# Patient Record
Sex: MALE | Race: WHITE | NOT HISPANIC OR LATINO | Employment: FULL TIME | URBAN - METROPOLITAN AREA
[De-identification: names, ages, dates, MRNs, and addresses within clinical notes are randomized per-mention and may not be internally consistent; named-entity substitution may affect disease eponyms.]

---

## 2017-09-11 ENCOUNTER — APPOINTMENT (OUTPATIENT)
Dept: LAB | Facility: MEDICAL CENTER | Age: 52
End: 2017-09-11
Payer: COMMERCIAL

## 2017-09-11 ENCOUNTER — APPOINTMENT (OUTPATIENT)
Dept: LAB | Facility: HOSPITAL | Age: 52
End: 2017-09-11
Payer: COMMERCIAL

## 2017-09-11 ENCOUNTER — ALLSCRIPTS OFFICE VISIT (OUTPATIENT)
Dept: OTHER | Facility: OTHER | Age: 52
End: 2017-09-11

## 2017-09-11 DIAGNOSIS — Z13.220 ENCOUNTER FOR SCREENING FOR LIPOID DISORDERS: ICD-10-CM
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
## 2017-09-11 DIAGNOSIS — R39.12 POOR URINARY STREAM: ICD-10-CM

## 2017-09-11 DIAGNOSIS — R35.1 NOCTURIA: ICD-10-CM

## 2017-09-11 DIAGNOSIS — Z12.5 ENCOUNTER FOR SCREENING FOR MALIGNANT NEOPLASM OF PROSTATE: ICD-10-CM

## 2017-09-11 DIAGNOSIS — R35.0 FREQUENCY OF MICTURITION: ICD-10-CM

## 2017-09-11 LAB
ALBUMIN SERPL BCP-MCNC: 4.2 G/DL (ref 3.5–5)
ALP SERPL-CCNC: 67 U/L (ref 46–116)
ALT SERPL W P-5'-P-CCNC: 24 U/L (ref 12–78)
ANION GAP SERPL CALCULATED.3IONS-SCNC: 6 MMOL/L (ref 4–13)
AST SERPL W P-5'-P-CCNC: 17 U/L (ref 5–45)
BASOPHILS # BLD AUTO: 0.02 THOUSANDS/ΜL (ref 0–0.1)
BASOPHILS NFR BLD AUTO: 0 % (ref 0–1)
BILIRUB SERPL-MCNC: 0.65 MG/DL (ref 0.2–1)
BILIRUB UR QL STRIP: NORMAL
BUN SERPL-MCNC: 15 MG/DL (ref 5–25)
CALCIUM SERPL-MCNC: 9.5 MG/DL (ref 8.3–10.1)
CHLORIDE SERPL-SCNC: 105 MMOL/L (ref 100–108)
CHOLEST SERPL-MCNC: 208 MG/DL (ref 50–200)
CO2 SERPL-SCNC: 29 MMOL/L (ref 21–32)
COLOR UR: YELLOW
CREAT SERPL-MCNC: 1.12 MG/DL (ref 0.6–1.3)
EOSINOPHIL # BLD AUTO: 0.09 THOUSAND/ΜL (ref 0–0.61)
EOSINOPHIL NFR BLD AUTO: 1 % (ref 0–6)
ERYTHROCYTE [DISTWIDTH] IN BLOOD BY AUTOMATED COUNT: 13.5 % (ref 11.6–15.1)
GFR SERPL CREATININE-BSD FRML MDRD: 76 ML/MIN/1.73SQ M
GLUCOSE (HISTORICAL): NORMAL
GLUCOSE P FAST SERPL-MCNC: 78 MG/DL (ref 65–99)
HCT VFR BLD AUTO: 43.9 % (ref 36.5–49.3)
HDLC SERPL-MCNC: 77 MG/DL (ref 40–60)
HGB BLD-MCNC: 15 G/DL (ref 12–17)
HGB UR QL STRIP.AUTO: 250
KETONES UR STRIP-MCNC: NORMAL MG/DL
LDLC SERPL CALC-MCNC: 123 MG/DL (ref 0–100)
LEUKOCYTE ESTERASE UR QL STRIP: NORMAL
LYMPHOCYTES # BLD AUTO: 1.49 THOUSANDS/ΜL (ref 0.6–4.47)
LYMPHOCYTES NFR BLD AUTO: 16 % (ref 14–44)
MCH RBC QN AUTO: 30.5 PG (ref 26.8–34.3)
MCHC RBC AUTO-ENTMCNC: 34.2 G/DL (ref 31.4–37.4)
MCV RBC AUTO: 89 FL (ref 82–98)
MONOCYTES # BLD AUTO: 0.5 THOUSAND/ΜL (ref 0.17–1.22)
MONOCYTES NFR BLD AUTO: 6 % (ref 4–12)
NEUTROPHILS # BLD AUTO: 7.01 THOUSANDS/ΜL (ref 1.85–7.62)
NEUTS SEG NFR BLD AUTO: 77 % (ref 43–75)
NITRITE UR QL STRIP: NORMAL
NRBC BLD AUTO-RTO: 0 /100 WBCS
PH UR STRIP.AUTO: 5 [PH]
PLATELET # BLD AUTO: 358 THOUSANDS/UL (ref 149–390)
PMV BLD AUTO: 10 FL (ref 8.9–12.7)
POTASSIUM SERPL-SCNC: 4.4 MMOL/L (ref 3.5–5.3)
PROT SERPL-MCNC: 7.7 G/DL (ref 6.4–8.2)
PROT UR STRIP-MCNC: NORMAL MG/DL
PSA SERPL-MCNC: 0.7 NG/ML (ref 0–4)
RBC # BLD AUTO: 4.92 MILLION/UL (ref 3.88–5.62)
SODIUM SERPL-SCNC: 140 MMOL/L (ref 136–145)
SP GR UR STRIP.AUTO: 1.02
TRIGL SERPL-MCNC: 40 MG/DL
TSH SERPL DL<=0.05 MIU/L-ACNC: 2 UIU/ML (ref 0.36–3.74)
UROBILINOGEN UR QL STRIP.AUTO: NORMAL
WBC # BLD AUTO: 9.13 THOUSAND/UL (ref 4.31–10.16)

## 2017-09-11 PROCEDURE — 80053 COMPREHEN METABOLIC PANEL: CPT

## 2017-09-11 PROCEDURE — 85025 COMPLETE CBC W/AUTO DIFF WBC: CPT

## 2017-09-11 PROCEDURE — 84443 ASSAY THYROID STIM HORMONE: CPT

## 2017-09-11 PROCEDURE — 86618 LYME DISEASE ANTIBODY: CPT

## 2017-09-11 PROCEDURE — 36415 COLL VENOUS BLD VENIPUNCTURE: CPT

## 2017-09-11 PROCEDURE — G0103 PSA SCREENING: HCPCS

## 2017-09-11 PROCEDURE — 80061 LIPID PANEL: CPT

## 2017-09-11 PROCEDURE — 87086 URINE CULTURE/COLONY COUNT: CPT

## 2017-09-12 LAB
B BURGDOR IGG SER IA-ACNC: 0.2
B BURGDOR IGM SER IA-ACNC: 0.19
BACTERIA UR CULT: NORMAL

## 2017-09-13 ENCOUNTER — GENERIC CONVERSION - ENCOUNTER (OUTPATIENT)
Dept: OTHER | Facility: OTHER | Age: 52
End: 2017-09-13

## 2017-10-06 ENCOUNTER — GENERIC CONVERSION - ENCOUNTER (OUTPATIENT)
Dept: OTHER | Facility: OTHER | Age: 52
End: 2017-10-06

## 2018-01-12 VITALS
DIASTOLIC BLOOD PRESSURE: 64 MMHG | BODY MASS INDEX: 27.34 KG/M2 | TEMPERATURE: 98.5 F | HEIGHT: 66 IN | SYSTOLIC BLOOD PRESSURE: 106 MMHG | HEART RATE: 76 BPM | RESPIRATION RATE: 16 BRPM | OXYGEN SATURATION: 98 % | WEIGHT: 170.13 LBS

## 2018-01-15 NOTE — RESULT NOTES
Verified Results  (1) URINE CULTURE 11Sep2017 07:10PM Kemar Bustos Order Number: [id number]     Test Name Result Flag Reference   CLINICAL REPORT (Report)     Test:        Urine culture  Specimen Type:   Urine  Specimen Date:   9/11/2017 7:10 PM  Result Date:    9/12/2017 7:24 PM  Result Status:   Final result  Resulting Lab:   [place] [address]            Tel: [phone]      CULTURE                                       ------------------                                   [id number] cfu/ml Mixed Contaminants X2     (1) LIPID PANEL, FASTING 11Sep2017 03:45PM Kemar Bustos Order Number: [id number]     Test Name Result Flag Reference   CHOLESTEROL 208 mg/dL H    HDL,DIRECT 77 mg/dL H 40-60   Specimen collection should occur prior to Metamizole administration due to the potential for falsley depressed results  LDL CHOLESTEROL CALCULATED 123 mg/dL H 0-100   Triglyceride:        Normal ??? ??? ??? ??? ??? ??? ??? <150 mg/dl   ??? ??? ???Borderline High ??? ??? 150-199 mg/dl   ??? ??? ? ?? High ??? ??? ??? ??? ??? ??? ??? 200-499 mg/dl   ??? ??? ? ??Very High ??? ??? ??? ??? ??? >499 mg/dl      Cholesterol:       Desirable ??? ??? ??? ??? <200 mg/dl   ??? ??? Borderline High ??? 200-239 mg/dl   ??? ??? High ??? ??? ??? ??? ??? ??? >239 mg/dl      HDL Cholesterol:       High ??? ???>59 mg/dL   ??? ??? Low ??? ??? <41 mg/dL      This screening LDL is a calculated result  It does not have the accuracy of the Direct Measured LDL in the monitoring of patients with hyperlipidemia and/or statin therapy  Direct Measure LDL (QZX340) must be ordered separately in these patients  TRIGLYCERIDES 40 mg/dL  <=150   Specimen collection should occur prior to N-Acetylcysteine or Metamizole administration due to the potential for falsely depressed results       (1) TSH [id number] 03:45PM Kemar Bustos Order Number: [id number]     Test Name Result Flag Reference   TSH 2 000 uIU/mL  0 358-3 740   Patients undergoing fluorescein dye angiography may retain small amounts of fluorescein in the body for 48-72 hours post procedure  Samples containing fluorescein can produce falsely depressed TSH values  If the patient had this procedure,a specimen should be resubmitted post fluorescein clearance  (1) COMPREHENSIVE METABOLIC PANEL [id number] 41:82AP [facility] Order Number: [id number]     Test Name Result Flag Reference   SODIUM 140 mmol/L  136-145   POTASSIUM 4 4 mmol/L  3 5-5 3   CHLORIDE 105 mmol/L  100-108   CARBON DIOXIDE 29 mmol/L  21-32   ANION GAP (CALC) 6 mmol/L  4-13   BLOOD UREA NITROGEN 15 mg/dL  5-25   CREATININE 1 12 mg/dL  0 60-1 30   Standardized to IDMS reference method   CALCIUM 9 5 mg/dL  8 3-10 1   BILI, TOTAL 0 65 mg/dL  0 20-1 00   ALK PHOSPHATAS 67 U/L     ALT (SGPT) 24 U/L  12-78   Specimen collection should occur prior to Sulfasalazine and/or Sulfapyridine administration due to the potential for falsely depressed results  AST(SGOT) 17 U/L  5-45   Specimen collection should occur prior to Sulfasalazine administration due to the potential for falsely depressed results  ALBUMIN 4 2 g/dL  3 5-5 0   TOTAL PROTEIN 7 7 g/dL  6 4-8 2   eGFR 76 ml/min/1 73sq m     National Kidney Disease Education Program recommendations are as follows:  GFR calculation is accurate only with a steady state creatinine  Chronic Kidney disease less than 60 ml/min/1 73 sq  meters  Kidney failure less than 15 ml/min/1 73 sq  meters  GLUCOSE FASTING 78 mg/dL  65-99   Specimen collection should occur prior to Sulfasalazine administration due to the potential for falsely depressed results  Specimen collection should occur prior to Sulfapyridine administration due to the potential for falsely elevated results       (1) CBC/PLT/DIFF [id number] 03:45PM Carolina Dey Order Number: [id number]     Test Name Result Flag Reference   WBC COUNT 9 13 Thousand/uL  4 31-10 16   RBC COUNT 4 92 Million/uL  3 88-5 62   HEMOGLOBIN 15 0 g/dL  12 0-17 0   HEMATOCRIT 43 9 %  36 5-49 3   MCV 89 fL  82-98   MCH 30 5 pg  26 8-34 3   MCHC 34 2 g/dL  31 4-37 4   RDW 13 5 %  11 6-15 1   MPV 10 0 fL  8 9-12 7   PLATELET COUNT 983 Thousands/uL  149-390   nRBC AUTOMATED 0 /100 WBCs     NEUTROPHILS RELATIVE PERCENT 77 % H 43-75   LYMPHOCYTES RELATIVE PERCENT 16 %  14-44   MONOCYTES RELATIVE PERCENT 6 %  4-12   EOSINOPHILS RELATIVE PERCENT 1 %  0-6   BASOPHILS RELATIVE PERCENT 0 %  0-1   NEUTROPHILS ABSOLUTE COUNT 7 01 Thousands/? ??L  1 85-7 62   LYMPHOCYTES ABSOLUTE COUNT 1 49 Thousands/? ??L  0 60-4 47   MONOCYTES ABSOLUTE COUNT 0 50 Thousand/? ??L  0 17-1 22   EOSINOPHILS ABSOLUTE COUNT 0 09 Thousand/? ??L  0 00-0 61   BASOPHILS ABSOLUTE COUNT 0 02 Thousands/? ??L  0 00-0 10     (1) PSA (SCREEN) (Dx V76 44 Screen for Prostate Cancer) 11Sep2017 03:45PM [facility] Order Number: [id number]     Test Name Result Flag Reference   PROSTATE SPECIFIC ANTIGEN 0 7 ng/mL  0 0-4 0   American Urological Association Guidelines define biochemical recurrence of prostate cancer as a detectable or rising PSA value post-radical prostatectomy that is greater than or equal to 0 2 ng/mL with a second confirmatory level of greater than or equal to 0 2 ng/mL  (1) LYME ANTIBODY PROFILE [facility] TO [facility]OT 11Sep2017 03:45PM [facility] Order Number: [id number]     Test Name Result Flag Reference   LYME IGG 0 20  0 00-0 79   NEGATIVE(0 00-0 79)-Absence of detectable Borrelia IgG Antibodies  A negative result does not exclude the possibility of Borrelia infection  If early Lyme disease is suspected,a second sample should be collected & tested 4 weeks after initial testing  LYME IGM 0 19  0 00-0 79   NEGATIVE (0 00-0 79)-Absence of detectable Borrelia IgM antibodies   A negative result does not exclude the possibility of Borrelia infection  If early lyme disease is suspected, a second sample should be collected & tested 4 weeks after initial testing         Plan  Nocturia, Urinary frequency, Weak urinary stream    · Tamsulosin HCl - 0 4 MG Oral Capsule; TAKE 1 CAPSULE AT BEDTIME NIGHTLY   · *1 -  [facility]  *  Status: Active  Requested for:  [id number]  Care Summary provided  : Yes

## 2018-01-26 ENCOUNTER — HOSPITAL ENCOUNTER (EMERGENCY)
Facility: HOSPITAL | Age: 53
Discharge: HOME | End: 2018-01-26
Attending: EMERGENCY MEDICINE | Admitting: EMERGENCY MEDICINE
Payer: OTHER MISCELLANEOUS

## 2018-01-26 ENCOUNTER — APPOINTMENT (OUTPATIENT)
Dept: URGENT CARE | Facility: MEDICAL CENTER | Age: 53
End: 2018-01-26
Payer: OTHER MISCELLANEOUS

## 2018-01-26 VITALS
BODY MASS INDEX: 26.95 KG/M2 | RESPIRATION RATE: 14 BRPM | WEIGHT: 167 LBS | OXYGEN SATURATION: 100 % | TEMPERATURE: 98 F | HEART RATE: 80 BPM | DIASTOLIC BLOOD PRESSURE: 87 MMHG | SYSTOLIC BLOOD PRESSURE: 171 MMHG

## 2018-01-26 DIAGNOSIS — K40.90 INGUINAL HERNIA: Primary | ICD-10-CM

## 2018-01-26 PROCEDURE — 99283 EMERGENCY DEPT VISIT LOW MDM: CPT

## 2018-01-26 PROCEDURE — 99283 EMERGENCY DEPT VISIT LOW MDM: CPT | Performed by: PHYSICIAN ASSISTANT

## 2018-01-26 PROCEDURE — G0382 LEV 3 HOSP TYPE B ED VISIT: HCPCS | Performed by: PHYSICIAN ASSISTANT

## 2018-01-26 RX ORDER — TAMSULOSIN HYDROCHLORIDE 0.4 MG/1
0.4 CAPSULE ORAL DAILY
COMMUNITY
Start: 2017-09-13 | End: 2018-04-03 | Stop reason: SDUPTHER

## 2018-01-26 RX ORDER — IBUPROFEN 200 MG
TABLET ORAL EVERY 6 HOURS PRN
COMMUNITY
End: 2021-10-01 | Stop reason: ALTCHOICE

## 2018-01-26 NOTE — DISCHARGE INSTRUCTIONS
Diagnosis: right sided reducible inguinal hernia    - as of now your hernia sac is reducible- it comes out and can be pushed back in     - activity as tolerated --  No heavy lifting/bending over -- lift with legs    - please call dr Tonie Lozada- office- general surgeon -- on Monday to schedule an appt - to discuss an outpatient repair     - as of now your hernia came be pushed back in -- this is called reducible -- if you notice that your pain and swelling continues to get worse over time and the hernia pops out and does nto not go back in - t his would cause constant pain in area-  Please return to  The er - this would mean that the hernia is stuck or incarcerated -- this is an emergency

## 2018-01-26 NOTE — ED PROVIDER NOTES
History  Chief Complaint   Patient presents with    Abdominal Pain     Lower right abdominal pain  Pt states that he lifted something up at work and felt something pop  Pt went up to [facility] and was told that he might have a hernia  47 yr  Male at work at 8 am while lifting object fell pop in r groin area -- that pain has since imporved- no vomiting/ no other comps        History provided by:  Patient   used: No        Prior to Admission Medications   Prescriptions Last Dose Informant Patient Reported? Taking?   ibuprofen (MOTRIN) 200 mg tablet   Yes Yes   Sig: Take by mouth every 6 (six) hours as needed for mild pain   tamsulosin (FLOMAX) 0 4 mg   Yes Yes   Sig: Take 0 4 mg by mouth daily      Facility-Administered Medications: None       Past Medical History:   Diagnosis Date    Frequent urination        History reviewed  No pertinent surgical history  History reviewed  No pertinent family history  I have reviewed and agree with the history as documented  Social History   Substance Use Topics    Smoking status: Never Smoker    Smokeless tobacco: Never Used    Alcohol use No        Review of Systems   Constitutional: Negative  HENT: Negative  Eyes: Negative  Respiratory: Negative  Cardiovascular: Negative  Gastrointestinal: Negative  R inguinal pain   Endocrine: Negative  Genitourinary: Negative  Musculoskeletal: Negative  Skin: Negative  Allergic/Immunologic: Negative  Neurological: Negative  Hematological: Negative  Psychiatric/Behavioral: Negative          Physical Exam  ED Triage Vitals   Temperature Pulse Respirations Blood Pressure SpO2   01/26/18 1355 01/26/18 1355 01/26/18 1355 01/26/18 1355 01/26/18 1355   98 °F (36 7 °C) 88 16 143/75 100 %      Temp Source Heart Rate Source Patient Position - Orthostatic VS BP Location FiO2 (%)   01/26/18 1343 01/26/18 1343 01/26/18 1355 01/26/18 1355 --   Oral Monitor Sitting Left arm Pain Score       01/26/18 1355       6           Orthostatic Vital Signs  Vitals:    01/26/18 1355 01/26/18 1642   BP: 143/75 (!) 171/87   Pulse: 88 80   Patient Position - Orthostatic VS: Sitting        Physical Exam   Constitutional: He is oriented to person, place, and time  He appears well-developed and well-nourished  No distress  avss- htnsive- pulse ox 100 % on ra- intepretation is normal- no intervention    HENT:   Head: Normocephalic and atraumatic  Right Ear: External ear normal    Left Ear: External ear normal    Nose: Nose normal    Mouth/Throat: Oropharynx is clear and moist  No oropharyngeal exudate  Eyes: Conjunctivae and EOM are normal  Pupils are equal, round, and reactive to light  Right eye exhibits no discharge  Left eye exhibits no discharge  No scleral icterus  Mm pink   Neck: Normal range of motion  Neck supple  No JVD present  No tracheal deviation present  No thyromegaly present  Cardiovascular: Normal rate, regular rhythm, normal heart sounds and intact distal pulses  Exam reveals no gallop and no friction rub  No murmur heard  Pulmonary/Chest: Effort normal and breath sounds normal  No stridor  No respiratory distress  He has no wheezes  He has no rales  He exhibits no tenderness  Abdominal: Soft  Bowel sounds are normal  He exhibits no distension and no mass  There is no tenderness  There is no rebound and no guarding  No hernia  upon pt coughing- with r inguinal hernia sac appreciated- easily reduced-- - normal testicle/ scrotal exam - normal perineal exam   Genitourinary: Penis normal    Musculoskeletal: Normal range of motion  He exhibits no edema, tenderness or deformity  Lymphadenopathy:     He has no cervical adenopathy  Neurological: He is alert and oriented to person, place, and time  No cranial nerve deficit or sensory deficit  He exhibits normal muscle tone  Coordination normal    Skin: Skin is warm  Capillary refill takes less than 2 seconds   No rash noted  He is not diaphoretic  No erythema  No pallor  Psychiatric: He has a normal mood and affect  His behavior is normal  Judgment and thought content normal    Nursing note and vitals reviewed  ED Medications  Medications - No data to display    Diagnostic Studies  Results Reviewed     None                 No orders to display              Procedures  Procedures       Phone Contacts  ED Phone Contact    ED Course  ED Course                                MDM  CritCare Time    Disposition  Final diagnoses:   Inguinal hernia     Time reflects when diagnosis was documented in both MDM as applicable and the Disposition within this note     Time User Action Codes Description Comment    1/26/2018  5:10 PM Stanley Long [K40 90] Inguinal hernia       ED Disposition     ED Disposition Condition Comment    Discharge  Jaydon Sheller discharge to home/self care  Condition at discharge: Good        Follow-up Information    None       Patient's Medications   Discharge Prescriptions    No medications on file     No discharge procedures on file      ED Provider  Electronically Signed by           Alejandra Thomas MD  01/27/18 0130

## 2018-01-31 ENCOUNTER — TRANSCRIBE ORDERS (OUTPATIENT)
Dept: ADMINISTRATIVE | Facility: HOSPITAL | Age: 53
End: 2018-01-31

## 2018-01-31 ENCOUNTER — OFFICE VISIT (OUTPATIENT)
Dept: URGENT CARE | Facility: MEDICAL CENTER | Age: 53
End: 2018-01-31
Payer: OTHER MISCELLANEOUS

## 2018-01-31 DIAGNOSIS — K40.90 NON-RECURRENT UNILATERAL INGUINAL HERNIA WITHOUT OBSTRUCTION OR GANGRENE: Primary | ICD-10-CM

## 2018-01-31 PROCEDURE — 93005 ELECTROCARDIOGRAM TRACING: CPT

## 2018-02-01 LAB
ATRIAL RATE: 60 BPM
P AXIS: 40 DEGREES
PR INTERVAL: 132 MS
QRS AXIS: 9 DEGREES
QRSD INTERVAL: 88 MS
QT INTERVAL: 382 MS
QTC INTERVAL: 382 MS
T WAVE AXIS: 14 DEGREES
VENTRICULAR RATE: 60 BPM

## 2018-02-01 PROCEDURE — 93010 ELECTROCARDIOGRAM REPORT: CPT | Performed by: INTERNAL MEDICINE

## 2018-02-06 NOTE — PRE-PROCEDURE INSTRUCTIONS
Pre-Surgery Instructions:   Medication Instructions    Flaxseed, Linseed, (FLAX SEED OIL PO) Patient was instructed by Physician and understands   ibuprofen (MOTRIN) 200 mg tablet Patient was instructed by Physician and understands   tamsulosin (FLOMAX) 0 4 mg Instructed patient per Anesthesia Guidelines  Pre op and bathing instructions reviewed   Pt has hibiclens

## 2018-02-09 ENCOUNTER — ANESTHESIA (OUTPATIENT)
Dept: PERIOP | Facility: HOSPITAL | Age: 53
End: 2018-02-09
Payer: OTHER MISCELLANEOUS

## 2018-02-09 ENCOUNTER — HOSPITAL ENCOUNTER (OUTPATIENT)
Facility: HOSPITAL | Age: 53
Setting detail: OUTPATIENT SURGERY
Discharge: HOME | End: 2018-02-09
Attending: SURGERY | Admitting: SURGERY
Payer: OTHER MISCELLANEOUS

## 2018-02-09 ENCOUNTER — ANESTHESIA EVENT (OUTPATIENT)
Dept: PERIOP | Facility: HOSPITAL | Age: 53
End: 2018-02-09
Payer: OTHER MISCELLANEOUS

## 2018-02-09 VITALS
WEIGHT: 167 LBS | HEART RATE: 55 BPM | TEMPERATURE: 97.3 F | DIASTOLIC BLOOD PRESSURE: 72 MMHG | BODY MASS INDEX: 26.84 KG/M2 | SYSTOLIC BLOOD PRESSURE: 116 MMHG | HEIGHT: 66 IN | RESPIRATION RATE: 16 BRPM | OXYGEN SATURATION: 99 %

## 2018-02-09 DIAGNOSIS — K40.90 INGUINAL HERNIA OF RIGHT SIDE WITHOUT OBSTRUCTION OR GANGRENE: Primary | ICD-10-CM

## 2018-02-09 PROCEDURE — C1781 MESH (IMPLANTABLE): HCPCS | Performed by: SURGERY

## 2018-02-09 DEVICE — MODIFIED ONFLEX MESH
Type: IMPLANTABLE DEVICE | Site: INGUINAL | Status: FUNCTIONAL
Brand: MODIFIED ONFLEX MESH

## 2018-02-09 RX ORDER — OXYCODONE HYDROCHLORIDE AND ACETAMINOPHEN 5; 325 MG/1; MG/1
2 TABLET ORAL EVERY 4 HOURS PRN
Qty: 28 TABLET | Refills: 0 | Status: SHIPPED | OUTPATIENT
Start: 2018-02-09 | End: 2019-09-24

## 2018-02-09 RX ORDER — ONDANSETRON 2 MG/ML
4 INJECTION INTRAMUSCULAR; INTRAVENOUS EVERY 4 HOURS PRN
Status: DISCONTINUED | OUTPATIENT
Start: 2018-02-09 | End: 2018-02-09 | Stop reason: HOSPADM

## 2018-02-09 RX ORDER — SODIUM CHLORIDE 9 MG/ML
INJECTION, SOLUTION INTRAVENOUS CONTINUOUS PRN
Status: DISCONTINUED | OUTPATIENT
Start: 2018-02-09 | End: 2018-02-09 | Stop reason: SURG

## 2018-02-09 RX ORDER — ONDANSETRON 2 MG/ML
4 INJECTION INTRAMUSCULAR; INTRAVENOUS ONCE AS NEEDED
Status: DISCONTINUED | OUTPATIENT
Start: 2018-02-09 | End: 2018-02-09 | Stop reason: HOSPADM

## 2018-02-09 RX ORDER — SODIUM CHLORIDE, SODIUM LACTATE, POTASSIUM CHLORIDE, CALCIUM CHLORIDE 600; 310; 30; 20 MG/100ML; MG/100ML; MG/100ML; MG/100ML
125 INJECTION, SOLUTION INTRAVENOUS CONTINUOUS
Status: DISCONTINUED | OUTPATIENT
Start: 2018-02-09 | End: 2018-02-09 | Stop reason: HOSPADM

## 2018-02-09 RX ORDER — ONDANSETRON 2 MG/ML
INJECTION INTRAMUSCULAR; INTRAVENOUS AS NEEDED
Status: DISCONTINUED | OUTPATIENT
Start: 2018-02-09 | End: 2018-02-09 | Stop reason: SURG

## 2018-02-09 RX ORDER — MORPHINE SULFATE 4 MG/ML
4 INJECTION, SOLUTION INTRAMUSCULAR; INTRAVENOUS
Status: DISCONTINUED | OUTPATIENT
Start: 2018-02-09 | End: 2018-02-09 | Stop reason: HOSPADM

## 2018-02-09 RX ORDER — PROPOFOL 10 MG/ML
INJECTION, EMULSION INTRAVENOUS AS NEEDED
Status: DISCONTINUED | OUTPATIENT
Start: 2018-02-09 | End: 2018-02-09 | Stop reason: SURG

## 2018-02-09 RX ORDER — MIDAZOLAM HYDROCHLORIDE 1 MG/ML
INJECTION INTRAMUSCULAR; INTRAVENOUS AS NEEDED
Status: DISCONTINUED | OUTPATIENT
Start: 2018-02-09 | End: 2018-02-09 | Stop reason: SURG

## 2018-02-09 RX ORDER — SODIUM CHLORIDE 9 MG/ML
100 INJECTION, SOLUTION INTRAVENOUS CONTINUOUS
Status: DISCONTINUED | OUTPATIENT
Start: 2018-02-09 | End: 2018-02-09 | Stop reason: HOSPADM

## 2018-02-09 RX ORDER — KETOROLAC TROMETHAMINE 30 MG/ML
INJECTION, SOLUTION INTRAMUSCULAR; INTRAVENOUS AS NEEDED
Status: DISCONTINUED | OUTPATIENT
Start: 2018-02-09 | End: 2018-02-09 | Stop reason: SURG

## 2018-02-09 RX ORDER — BUPIVACAINE HYDROCHLORIDE AND EPINEPHRINE 2.5; 5 MG/ML; UG/ML
INJECTION, SOLUTION EPIDURAL; INFILTRATION; INTRACAUDAL; PERINEURAL AS NEEDED
Status: DISCONTINUED | OUTPATIENT
Start: 2018-02-09 | End: 2018-02-09 | Stop reason: HOSPADM

## 2018-02-09 RX ORDER — FENTANYL CITRATE/PF 50 MCG/ML
25 SYRINGE (ML) INJECTION
Status: DISCONTINUED | OUTPATIENT
Start: 2018-02-09 | End: 2018-02-09 | Stop reason: HOSPADM

## 2018-02-09 RX ORDER — GLYCOPYRROLATE 0.2 MG/ML
INJECTION INTRAMUSCULAR; INTRAVENOUS AS NEEDED
Status: DISCONTINUED | OUTPATIENT
Start: 2018-02-09 | End: 2018-02-09 | Stop reason: SURG

## 2018-02-09 RX ORDER — OXYCODONE HYDROCHLORIDE AND ACETAMINOPHEN 5; 325 MG/1; MG/1
2 TABLET ORAL EVERY 4 HOURS PRN
Status: DISCONTINUED | OUTPATIENT
Start: 2018-02-09 | End: 2018-02-09 | Stop reason: HOSPADM

## 2018-02-09 RX ORDER — FENTANYL CITRATE 50 UG/ML
INJECTION, SOLUTION INTRAMUSCULAR; INTRAVENOUS AS NEEDED
Status: DISCONTINUED | OUTPATIENT
Start: 2018-02-09 | End: 2018-02-09 | Stop reason: SURG

## 2018-02-09 RX ADMIN — FENTANYL CITRATE 50 MCG: 50 INJECTION INTRAMUSCULAR; INTRAVENOUS at 09:37

## 2018-02-09 RX ADMIN — PROPOFOL 200 MG: 10 INJECTION, EMULSION INTRAVENOUS at 09:35

## 2018-02-09 RX ADMIN — LIDOCAINE HYDROCHLORIDE 100 MG: 20 INJECTION, SOLUTION INTRAVENOUS at 09:35

## 2018-02-09 RX ADMIN — CEFAZOLIN SODIUM 1000 MG: 1 SOLUTION INTRAVENOUS at 09:32

## 2018-02-09 RX ADMIN — OXYCODONE HYDROCHLORIDE AND ACETAMINOPHEN 2 TABLET: 5; 325 TABLET ORAL at 11:12

## 2018-02-09 RX ADMIN — KETOROLAC TROMETHAMINE 30 MG: 30 INJECTION, SOLUTION INTRAMUSCULAR at 10:06

## 2018-02-09 RX ADMIN — SODIUM CHLORIDE: 0.9 INJECTION, SOLUTION INTRAVENOUS at 08:44

## 2018-02-09 RX ADMIN — MIDAZOLAM HYDROCHLORIDE 2 MG: 1 INJECTION, SOLUTION INTRAMUSCULAR; INTRAVENOUS at 09:32

## 2018-02-09 RX ADMIN — GLYCOPYRROLATE 0.2 MG: 0.2 INJECTION, SOLUTION INTRAMUSCULAR; INTRAVENOUS at 09:35

## 2018-02-09 RX ADMIN — ONDANSETRON 4 MG: 2 INJECTION INTRAMUSCULAR; INTRAVENOUS at 10:06

## 2018-02-09 RX ADMIN — DEXAMETHASONE SODIUM PHOSPHATE 10 MG: 10 INJECTION INTRAMUSCULAR; INTRAVENOUS at 09:35

## 2018-02-09 NOTE — OP NOTE
OPERATIVE REPORT  PATIENT NAME: Kat Jeter    :  1965  MRN: [id number]  Pt Location: AN OR ROOM 04    SURGERY DATE: 2018    Surgeon(s) and Role:     * Caron Gonzales DO - Primary     * Sree Garza PA-C - Assisting  No qualified resident was available  Her assistance was required for exposure and retraction throughout the case    Preop Diagnosis:  Inguinal hernia [K40 90]    Post-Op Diagnosis Codes:     * Inguinal hernia [K40 90]    Procedure(s) (LRB):  INGUINAL HERNIA REPAIR (Right)  3 4 inch Onflex mesh    Specimen(s):  * No specimens in log *    Estimated Blood Loss:   Minimal    Drains:       Anesthesia Type:   Choice    Operative Indications:  Inguinal hernia [K40 90]      Operative Findings:  Direct inguinal hernia, left      Review of Systems/Medical History  Patient summary reviewed  Chart reviewed      Cardiovascular  Negative cardio ROS  Pulmonary  Negative pulmonary ROS       GI/Hepatic  No GERD ,       Negative  ROS       Endo/Other  Negative endo/other ROS     GYN      Hematology  Negative hematology ROS     Musculoskeletal  Negative musculoskeletal ROS       Neurology  Negative neurology ROS     Psychology   Negative psychology ROS          Height 66 inches weight 76 kg/167 lb BMI 27  ASA 2  Wound class 1  Complications:   None    Procedure and Technique:  The patient was brought into the operative suite and identified by visualization conversation by arm band  Athrombic pumps were placed  He was given preoperative antibiotics  Rightwas given preoperative antibiotics  inguinal region was then prepped and draped in a sterile fashion  A timeout was performed and it was assured that the prep was dry  Local was then instilled over the right inguinal canal  A skin incision was made and the subcutaneous tissues were divided with hot cautery down to the external oblique fascia   Fascia was opened up through the external ring to the level of the internal ring a Romy retractor was placed for exposure  The cremasterics fibers were dissected off of the spermatic cord structures  Spermatic cord structures were encircled with a Penrose drain for control  There were no signs of an indirect inguinal hernia  There was an obvious defect in the floor the canal consistent with a direct inguinal hernia  Transversalis tissues were scored and the preperitoneal space was developed  A 3 4 inch Onflex Bard mesh was chosen  Lateral side was cut to allow it to fit along the inguinal canal properly  This was placed in the preperitoneal space  The tails were anchored each side with 2 0 Vicryl  The onlay mesh was then placed on the floor the canal and anchored with 2-0 Vicryl to the pubic tubercle shelving edge and conjoined tendon  I extended a cut from the superior aspect of the mesh down to the cord structures  The resultant 2 tails were brought around the cord and anchored to themselves as well as the underlying transversalis tissues with 2-0 Vicryl  This resulted in the creation of a new internal ring  The tails were trimmed and tucked under the external oblique fascia  Copious irrigation was carried out  External oblique fascia was reapproximated on top of the cord with a running 2-0 Vicryl suture  Irrigation was carried out  Isidro's was then reapproximating using simple 2-0 Vicryl sutures  Skin incision was closed using 4-0 Monocryl and a subsequent care fashion  Wound was washed and dried and sterile Histoacryl was applied  It was assured that the testicle was in the scrotum at the completion of the case  The patient was awakened in the operating room and returned to recovery area in stable condition having tolerated the procedure well     I was present for the entire procedure   I was present for the entire procedure    Patient Disposition:  PACU     SIGNATURE: Leo Smallwood DO  DATE: February 9, 2018  TIME: 10:19 AM

## 2018-02-09 NOTE — DISCHARGE INSTRUCTIONS
Please call the office when you leave to schedule an appointment to be seen in 2-3 weeks    Activity:    Do not lift more than 10 pounds (a gallon of milk) for 1 weeks post-operatively                 Walking is encouraged  Normal daily activities including climbing steps are okay  Do not engage in strenuous activity or contact sports for 4 weeks post-operatively    Return to work:   Return to work to be discussed at first post-operative visit    Diet:   You may return to your normal heart healthy diet    Wound Care: Your wound is closed with histoacryl  It is okay to shower  Wash incision gently with soap and water and pat dry  Do not soak incisions in bath water or swim for two weeks  Do not apply any creams or ointments  May apply ice to wound    Pain Medication:   Please take as directed  No driving while taking narcotic pain medications    Other:  If you have questions after discharge please call the office      If you have increased pain, fever >101 5, increased drainage, redness or a bad smell at your surgery site, please call us immediately or come directly to the Emergency Room

## 2018-02-09 NOTE — ANESTHESIA PREPROCEDURE EVALUATION
Review of Systems/Medical History  Patient summary reviewed  Chart reviewed      Cardiovascular  Negative cardio ROS    Pulmonary  Negative pulmonary ROS        GI/Hepatic    No GERD ,        Negative  ROS        Endo/Other  Negative endo/other ROS      GYN       Hematology  Negative hematology ROS      Musculoskeletal  Negative musculoskeletal ROS        Neurology  Negative neurology ROS      Psychology   Negative psychology ROS              Physical Exam    Airway    Mallampati score: II  TM Distance: <3 FB  Neck ROM: full     Dental   No notable dental hx     Cardiovascular  Comment: Negative ROS, Cardiovascular exam normal    Pulmonary  Pulmonary exam normal     Other Findings        Anesthesia Plan  ASA Score- 1     Anesthesia Type- general with ASA Monitors  Additional Monitors:   Airway Plan: LMA  Plan Factors-  Patient did not smoke on day of surgery  Induction- intravenous  Postoperative Plan-     Informed Consent- Anesthetic plan and risks discussed with patient  I personally reviewed this patient with the CRNA  Discussed and agreed on the Anesthesia Plan with the CRNA  Adilene Vasquez

## 2018-04-03 DIAGNOSIS — N40.1 NOCTURIA ASSOCIATED WITH BENIGN PROSTATIC HYPERPLASIA: Primary | ICD-10-CM

## 2018-04-03 DIAGNOSIS — R35.1 NOCTURIA ASSOCIATED WITH BENIGN PROSTATIC HYPERPLASIA: Primary | ICD-10-CM

## 2018-04-05 RX ORDER — TAMSULOSIN HYDROCHLORIDE 0.4 MG/1
0.4 CAPSULE ORAL
Qty: 90 CAPSULE | Refills: 1 | Status: SHIPPED | OUTPATIENT
Start: 2018-04-05 | End: 2019-09-24 | Stop reason: ALTCHOICE

## 2019-04-24 ENCOUNTER — TELEPHONE (OUTPATIENT)
Dept: FAMILY MEDICINE CLINIC | Facility: CLINIC | Age: 54
End: 2019-04-24

## 2019-06-14 ENCOUNTER — TELEPHONE (OUTPATIENT)
Dept: FAMILY MEDICINE CLINIC | Facility: CLINIC | Age: 54
End: 2019-06-14

## 2019-06-18 ENCOUNTER — OFFICE VISIT (OUTPATIENT)
Dept: UROLOGY | Facility: AMBULATORY SURGERY CENTER | Age: 54
End: 2019-06-18
Payer: COMMERCIAL

## 2019-06-18 VITALS
WEIGHT: 169 LBS | HEIGHT: 66 IN | BODY MASS INDEX: 27.16 KG/M2 | HEART RATE: 88 BPM | DIASTOLIC BLOOD PRESSURE: 62 MMHG | SYSTOLIC BLOOD PRESSURE: 118 MMHG

## 2019-06-18 DIAGNOSIS — R39.15 URGENCY OF URINATION: Primary | ICD-10-CM

## 2019-06-18 DIAGNOSIS — N52.9 ERECTILE DYSFUNCTION, UNSPECIFIED ERECTILE DYSFUNCTION TYPE: ICD-10-CM

## 2019-06-18 LAB
POST-VOID RESIDUAL VOLUME, ML POC: 12 ML
SL AMB  POCT GLUCOSE, UA: NORMAL
SL AMB LEUKOCYTE ESTERASE,UA: NORMAL
SL AMB POCT BILIRUBIN,UA: NORMAL
SL AMB POCT BLOOD,UA: NORMAL
SL AMB POCT CLARITY,UA: CLEAR
SL AMB POCT COLOR,UA: YELLOW
SL AMB POCT KETONES,UA: NORMAL
SL AMB POCT NITRITE,UA: NORMAL
SL AMB POCT PH,UA: 5
SL AMB POCT SPECIFIC GRAVITY,UA: 1.03
SL AMB POCT URINE PROTEIN: NORMAL
SL AMB POCT UROBILINOGEN: 0.2

## 2019-06-18 PROCEDURE — 81002 URINALYSIS NONAUTO W/O SCOPE: CPT | Performed by: UROLOGY

## 2019-06-18 PROCEDURE — 99244 OFF/OP CNSLTJ NEW/EST MOD 40: CPT | Performed by: UROLOGY

## 2019-06-18 PROCEDURE — 51798 US URINE CAPACITY MEASURE: CPT | Performed by: UROLOGY

## 2019-06-18 RX ORDER — OXYBUTYNIN CHLORIDE 10 MG/1
10 TABLET, EXTENDED RELEASE ORAL
Qty: 30 TABLET | Refills: 1 | Status: SHIPPED | OUTPATIENT
Start: 2019-06-18 | End: 2019-06-27 | Stop reason: SINTOL

## 2019-06-18 RX ORDER — SILDENAFIL CITRATE 20 MG/1
TABLET ORAL
Qty: 90 TABLET | Refills: 3 | Status: SHIPPED | OUTPATIENT
Start: 2019-06-18

## 2019-06-27 ENCOUNTER — TELEPHONE (OUTPATIENT)
Dept: UROLOGY | Facility: AMBULATORY SURGERY CENTER | Age: 54
End: 2019-06-27

## 2019-06-27 DIAGNOSIS — R39.15 URGENCY OF URINATION: Primary | ICD-10-CM

## 2019-06-27 NOTE — TELEPHONE ENCOUNTER
Nhangudelia Badillo is a patient of Vy Barahona and is seen at [place]  Patient would like a call back with his lab results  Patient requested a better medication if his results came back because it is drying his mouth  Please call and leave a message if not reached

## 2019-06-27 NOTE — TELEPHONE ENCOUNTER
Left detailed message for patient, per his request  Advising no recent lab results  Instructed to stop Oxybutynin and start Myrbetriq  Advised patient to call office with any questions

## 2019-06-27 NOTE — TELEPHONE ENCOUNTER
Will switch medication to Myrbetriq 25mg  This was electronically sent to his [facility] pharmacy

## 2019-07-25 ENCOUNTER — TELEPHONE (OUTPATIENT)
Dept: UROLOGY | Facility: MEDICAL CENTER | Age: 54
End: 2019-07-25

## 2019-07-25 NOTE — TELEPHONE ENCOUNTER
Patient of Dr Nadiya Platt seen in the [place] office  Patient's appointment was rescheduled  Patient advised that he needs a Wednesday appointment after 5:15 pm  Patient had prescheduled off for the original appointment  Could not find an available time frame that worked for the patient  Please advise

## 2019-07-25 NOTE — TELEPHONE ENCOUNTER
Called patient back and explained we don't have f/u appointments after 4 and that there was an appointment with Johnnie Salinas for 4:00 on 8/27    Told him to call to set up an appointment

## 2019-08-21 ENCOUNTER — OFFICE VISIT (OUTPATIENT)
Dept: UROLOGY | Facility: MEDICAL CENTER | Age: 54
End: 2019-08-21
Payer: COMMERCIAL

## 2019-08-21 VITALS
HEART RATE: 76 BPM | DIASTOLIC BLOOD PRESSURE: 82 MMHG | HEIGHT: 66 IN | BODY MASS INDEX: 27.16 KG/M2 | SYSTOLIC BLOOD PRESSURE: 124 MMHG | WEIGHT: 169 LBS

## 2019-08-21 DIAGNOSIS — R39.15 URGENCY OF URINATION: Primary | ICD-10-CM

## 2019-08-21 DIAGNOSIS — N52.9 ERECTILE DYSFUNCTION, UNSPECIFIED ERECTILE DYSFUNCTION TYPE: ICD-10-CM

## 2019-08-21 LAB
SL AMB  POCT GLUCOSE, UA: NEGATIVE
SL AMB LEUKOCYTE ESTERASE,UA: NEGATIVE
SL AMB POCT BILIRUBIN,UA: NEGATIVE
SL AMB POCT BLOOD,UA: ABNORMAL
SL AMB POCT CLARITY,UA: CLEAR
SL AMB POCT COLOR,UA: YELLOW
SL AMB POCT KETONES,UA: NEGATIVE
SL AMB POCT NITRITE,UA: NEGATIVE
SL AMB POCT PH,UA: 6
SL AMB POCT SPECIFIC GRAVITY,UA: 1.02
SL AMB POCT URINE PROTEIN: NEGATIVE
SL AMB POCT UROBILINOGEN: 0.2

## 2019-08-21 PROCEDURE — 99214 OFFICE O/P EST MOD 30 MIN: CPT | Performed by: UROLOGY

## 2019-08-21 PROCEDURE — 81003 URINALYSIS AUTO W/O SCOPE: CPT | Performed by: UROLOGY

## 2019-08-21 NOTE — PROGRESS NOTES
Assessment/Plan:    Erectile dysfunction  The patient had a satisfactory response to sildenafil  Urgency of urination  This symptom complex is compatible with the diagnosis of overactive bladder  Other etiologies need to be ruled out  Cystoscopy scheduled  May need urodynamics  Diagnoses and all orders for this visit:    Urgency of urination  -     POCT urine dip auto non-scope  -     PSA, Total Screen; Future    Erectile dysfunction, unspecified erectile dysfunction type  -     Testosterone, free, total; Future  -     Luteinizing hormone; Future  -     Sex Hormone Binding Globulin; Future          Subjective:      Patient ID: Ashu Omalley is a 47 y o  male  HPI  Urinary urgency:  Intolerable dry mouth from oxybutynin  Taking Myrbetriq  Urgency seems better but still very symptomatic  Knows every BR in town  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  Hx of perineal injury and scrotal hematoma at age 16 playing [employer]  Had difficulty voidng after that but it has become very severe in the last few years  Unclear if the perineal injury is a factor  He notes urinary frequency, nocturia x 5+ and intermittency  He denies other significant urinary symptoms  He denies gross hematuria, urinary tract infections or incontinence  He is taking neither medications nor supplements for his symptoms  AUA SYMPTOM SCORE      Most Recent Value   AUA SYMPTOM SCORE   How often have you had a sensation of not emptying your bladder completely after you finished urinating? 4   How often have you had to urinate again less than two hours after you finished urinating? 5   How often have you found you stopped and started again several times when you urinate? 5   How often have you found it difficult to postpone urination? 3   How often have you had a weak urinary stream?  4   How often have you had to push or strain to begin urination?   1   How many times did you most typically get up to urinate from the time you went to bed at night until the time you got up in the morning? 5   Quality of Life: If you were to spend the rest of your life with your urinary condition just the way it is now, how would you feel about that?  6   AUA SYMPTOM SCORE  27          Postvoid residual on June 18, 2019 was 12 mL  Accompanied by wife  Erectile dysfunction: In past 2 years since [facility] repair, he can acquire erection but cannot maintain  Sildenafil 40 mg is working [facility]  The following portions of the patient's history were reviewed and updated as appropriate: allergies, current medications, past family history, past medical history, past social history, past surgical history and problem list     Review of Systems   Constitutional: Negative for activity change and fatigue  Respiratory: Negative for shortness of breath and wheezing  Cardiovascular: Negative for chest pain  Gastrointestinal: Negative for abdominal pain  Genitourinary: Negative for difficulty urinating, dysuria, frequency, hematuria and urgency  Musculoskeletal: Negative for back pain and gait problem  Skin: Negative  Allergic/Immunologic: Negative  Neurological: Negative  Psychiatric/Behavioral: Negative  Objective:      /82 (BP Location: Left arm, Patient Position: Sitting, Cuff Size: Standard)   Pulse 76   Ht 5' 6" (1 676 m)   Wt 76 7 kg (169 lb)   BMI 27 28 kg/m²          Physical Exam   Constitutional: He is oriented to person, place, and time  He appears well-developed and well-nourished  HENT:   Head: Normocephalic and atraumatic  Eyes: EOM are normal    Neck: Normal range of motion  Pulmonary/Chest: Effort normal    Musculoskeletal: Normal range of motion  Neurological: He is alert and oriented to person, place, and time  Skin: Skin is warm and dry  Psychiatric: He has a normal mood and affect   His behavior is normal  Judgment and thought content normal        The patient was examined by Dr Ann Ford within the last few weeks  I have spent 45 minutes with patient and wife today in which greater than 50% of this time was spent in counseling/coordination of care regarding Risks and benefits of tx options, Patient and family education and Impressions

## 2019-08-21 NOTE — ASSESSMENT & PLAN NOTE
This symptom complex is compatible with the diagnosis of overactive bladder  Other etiologies need to be ruled out  Cystoscopy scheduled  May need urodynamics

## 2019-08-22 ENCOUNTER — APPOINTMENT (OUTPATIENT)
Dept: LAB | Facility: MEDICAL CENTER | Age: 54
End: 2019-08-22
Payer: COMMERCIAL

## 2019-09-04 ENCOUNTER — TELEPHONE (OUTPATIENT)
Dept: UROLOGY | Facility: MEDICAL CENTER | Age: 54
End: 2019-09-04

## 2019-09-04 DIAGNOSIS — R39.15 URGENCY OF URINATION: ICD-10-CM

## 2019-09-04 NOTE — TELEPHONE ENCOUNTER
----- Message from Amina Kebede MD sent at 9/3/2019  2:12 PM EDT -----  Testosterone levels are normal   The patient should continue to use sildenafil  When I saw him last, he was taking 40 milligrams but he can increase the dosage to 100 milligrams as necessary  Please inform the patient  Thank you

## 2019-09-04 NOTE — TELEPHONE ENCOUNTER
----- Message from Ino Diggs MD sent at 9/3/2019  2:12 PM EDT -----  Testosterone levels are normal   The patient should continue to use sildenafil  When I saw him last, he was taking 40 milligrams but he can increase the dosage to 100 milligrams as necessary  Please inform the patient  Thank you

## 2019-09-04 NOTE — TELEPHONE ENCOUNTER
Spoke with the patient; He does feel that his erections could be better, he would like to try the 100mg dose of Sildenafil  He'd like to get a written copy at his next visit on 09/24 so he can send it to a pharmacy in [facility]) and get it cheaper  He also notes that he's almost out of Myrbetriq and would like a refill sent to the [facility] in [place]  Will direct to Dr Mariam Barillas

## 2019-09-04 NOTE — TELEPHONE ENCOUNTER
Left message; Patient's testosterone levels are normal  Dr Madonna Coyle would like to know how the Sildenafil 40mg is working for him   If he needs, we can bump him up to a 100mg dose, but we'd need him to call and let us know first

## 2019-09-24 ENCOUNTER — PROCEDURE VISIT (OUTPATIENT)
Dept: UROLOGY | Facility: MEDICAL CENTER | Age: 54
End: 2019-09-24
Payer: COMMERCIAL

## 2019-09-24 VITALS
HEIGHT: 66 IN | HEART RATE: 76 BPM | DIASTOLIC BLOOD PRESSURE: 80 MMHG | SYSTOLIC BLOOD PRESSURE: 122 MMHG | BODY MASS INDEX: 27.06 KG/M2 | WEIGHT: 168.4 LBS

## 2019-09-24 DIAGNOSIS — N52.9 ERECTILE DYSFUNCTION, UNSPECIFIED ERECTILE DYSFUNCTION TYPE: ICD-10-CM

## 2019-09-24 DIAGNOSIS — R39.15 URINARY URGENCY: Primary | ICD-10-CM

## 2019-09-24 DIAGNOSIS — N32.81 OAB (OVERACTIVE BLADDER): ICD-10-CM

## 2019-09-24 DIAGNOSIS — Z71.89 OTHER SPECIFIED COUNSELING: ICD-10-CM

## 2019-09-24 LAB
SL AMB  POCT GLUCOSE, UA: NORMAL
SL AMB LEUKOCYTE ESTERASE,UA: NORMAL
SL AMB POCT BILIRUBIN,UA: NORMAL
SL AMB POCT BLOOD,UA: NORMAL
SL AMB POCT CLARITY,UA: CLEAR
SL AMB POCT COLOR,UA: YELLOW
SL AMB POCT KETONES,UA: NORMAL
SL AMB POCT NITRITE,UA: NORMAL
SL AMB POCT PH,UA: 5.5
SL AMB POCT SPECIFIC GRAVITY,UA: 1.01
SL AMB POCT URINE PROTEIN: NORMAL
SL AMB POCT UROBILINOGEN: 0.2

## 2019-09-24 PROCEDURE — 52000 CYSTOURETHROSCOPY: CPT | Performed by: UROLOGY

## 2019-09-24 PROCEDURE — 99214 OFFICE O/P EST MOD 30 MIN: CPT | Performed by: UROLOGY

## 2019-09-24 PROCEDURE — 81002 URINALYSIS NONAUTO W/O SCOPE: CPT | Performed by: UROLOGY

## 2019-09-24 RX ORDER — SILDENAFIL 100 MG/1
100 TABLET, FILM COATED ORAL AS NEEDED
Qty: 30 TABLET | Refills: 3 | Status: SHIPPED | OUTPATIENT
Start: 2019-09-24 | End: 2020-09-25 | Stop reason: SDUPTHER

## 2019-09-24 NOTE — PROGRESS NOTES
Assessment/Plan:    Erectile dysfunction  Increase sildenafil 200 mg p r n  OAB (overactive bladder)  Continue Myrbetriq  The patient was given permission to increase the dose to 50 mg daily  We discussed Botox, but he is not interested now  Of note, his insurance is not that good and he is afraid of the out-of-pocket costs  His testosterone levels cost him over $400  Diagnoses and all orders for this visit:    Urinary urgency  -     POCT urine dip    OAB (overactive bladder)    Erectile dysfunction, unspecified erectile dysfunction type  -     sildenafil (VIAGRA) 100 mg tablet; Take 1 tablet (100 mg total) by mouth as needed for erectile dysfunction    Other specified counseling          Subjective:      Patient ID: Froy Pittman is a 47 y o  male  HPI  Urinary urgency - overactive bladder:  Intolerable dry mouth from oxybutynin  Taking Myrbetriq  Urgency seems better but still very symptomatic       Knows every BR in town  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        Hx of perineal injury and scrotal hematoma at age 16 playing [employer]  Had difficulty voidng after that but it has become very severe in the last few years  Unclear if the perineal injury is a factor           He notes urinary frequency, nocturia x 5+ and intermittency  He denies other significant urinary symptoms  He denies gross hematuria, urinary tract infections or incontinence  He is taking neither medications nor supplements for his symptoms  Procedures  MALE FLEXIBLE CYSTOSCOPY    Preoperative diagnosis:  Overactive bladder, history of urethral injury    Postoperative diagnosis:  Overactive bladder, no urethral injury, essentially normal of visual exam     Operation:  Flexible cystoscopy    Surgeon:  Vanessa Grace MD,FACS    Anesthesia:  Xylocaine jelly    Procedure:  Patient was brought to the cystoscopy room and positively identified    The patient was prepped and draped in sterile fashion  Ten mL of 1% xylocaine jelly was instilled into the urethra  A penile clamp was applied  The flexible cystoscope was inserted  Findings are as follows:    Penile Urethra:  normal  Bulbar Urethra:  normal  Prostate:  Normal  Bladder:   Bladder Neck:  Normal  Ureteral orifices:   Normal  Mucosa:   Normal     Trabeculation:  None  PVR:  None  Other findings: The cystoscope was removed  The patient tolerated the procedure well  Following additional consultation to review the findings with the patient, he was discharged from the office in satisfactory condition  Impression:  Normal exam, supporting the diagnosis of idiopathic overactive bladder without visible underlying cause  Cystoscopic findings support the diagnosis of overactive bladder  The patient has variable symptoms are classic, as well  We discussed Botox in some detail however the patient would like to pursue pharmacologic therapy for now  Erectile dysfunction:  The patient has been taking sildenafil 40 mg as needed  He would like to do better  We discussed the 100 mg tablets and a prescription was sent for the patient  The patient is accompanied by his wife for the entire office visit  The following portions of the patient's history were reviewed and updated as appropriate: allergies, current medications, past family history, past medical history, past social history, past surgical history and problem list     Review of Systems   Constitutional: Negative for activity change and fatigue  Respiratory: Negative for shortness of breath and wheezing  Cardiovascular: Negative for chest pain  Gastrointestinal: Negative for abdominal pain  Genitourinary: Negative for difficulty urinating, dysuria, frequency, hematuria and urgency  Musculoskeletal: Negative for back pain and gait problem  Skin: Negative  Allergic/Immunologic: Negative  Neurological: Negative  Psychiatric/Behavioral: Negative  Objective:      /80 (BP Location: Left arm, Patient Position: Sitting, Cuff Size: Adult)   Pulse 76   Ht 5' 6" (1 676 m)   Wt 76 4 kg (168 lb 6 4 oz)   BMI 27 18 kg/m²          Physical Exam   Constitutional: He is oriented to person, place, and time  He appears well-developed and well-nourished  HENT:   Head: Normocephalic and atraumatic  Eyes: EOM are normal    Neck: Normal range of motion  Pulmonary/Chest: Effort normal    Genitourinary: Penis normal    Musculoskeletal: Normal range of motion  Neurological: He is alert and oriented to person, place, and time  Skin: Skin is warm and dry  Psychiatric: He has a normal mood and affect   His behavior is normal  Judgment and thought content normal

## 2019-09-24 NOTE — LETTER
September 24, 2019     Rich Boo MD  [address]    Patient: Froy Pittman   YOB: 1965   Date of Visit: 9/24/2019       Dear Dr Alecia Masterson: Thank you for referring Jayla Mccollum to me for evaluation  Below are my notes for this consultation  If you have questions, please do not hesitate to call me  I look forward to following your patient along with you  Sincerely,        Rosas Pappas MD        CC: No Recipients  Rosas Pappas MD  9/24/2019 10:50 AM  Incomplete  Assessment/Plan:    Erectile dysfunction  Increase sildenafil 200 mg p r n  OAB (overactive bladder)  Continue Myrbetriq  The patient was given permission to increase the dose to 50 mg daily  We discussed Botox, but he is not interested now  Of note, his insurance is not that good and he is afraid of the out-of-pocket costs  His testosterone levels cost him over $400  Diagnoses and all orders for this visit:    Urinary urgency  -     POCT urine dip    OAB (overactive bladder)    Erectile dysfunction, unspecified erectile dysfunction type  -     sildenafil (VIAGRA) 100 mg tablet; Take 1 tablet (100 mg total) by mouth as needed for erectile dysfunction    Other specified counseling          Subjective:      Patient ID: Froy Pittman is a 47 y o  male  HPI  Urinary urgency - overactive bladder:  Intolerable dry mouth from oxybutynin  Taking Myrbetriq  Urgency seems better but still very symptomatic       Knows every BR in town  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        Hx of perineal injury and scrotal hematoma at age 16 playing [employer]  Had difficulty voidng after that but it has become very severe in the last few years  Unclear if the perineal injury is a factor           He notes urinary frequency, nocturia x 5+ and intermittency  He denies other significant urinary symptoms    He denies gross hematuria, urinary tract infections or incontinence  He is taking neither medications nor supplements for his symptoms  Procedures  MALE FLEXIBLE CYSTOSCOPY    Preoperative diagnosis:  Overactive bladder, history of urethral injury    Postoperative diagnosis:  Overactive bladder, no urethral injury, essentially normal of visual exam     Operation:  Flexible cystoscopy    Surgeon:  Sweetie Ledesma MD,FACS    Anesthesia:  Xylocaine jelly    Procedure:  Patient was brought to the cystoscopy room and positively identified  The patient was prepped and draped in sterile fashion  Ten mL of 1% xylocaine jelly was instilled into the urethra  A penile clamp was applied  The flexible cystoscope was inserted  Findings are as follows:    Penile Urethra:  normal  Bulbar Urethra:  normal  Prostate:  Normal  Bladder:   Bladder Neck:  Normal  Ureteral orifices:   Normal  Mucosa:   Normal     Trabeculation:  None  PVR:  None  Other findings: The cystoscope was removed  The patient tolerated the procedure well  Following additional consultation to review the findings with the patient, he was discharged from the office in satisfactory condition  Impression:  Normal exam, supporting the diagnosis of idiopathic overactive bladder without visible underlying cause  Cystoscopic findings support the diagnosis of overactive bladder  The patient has variable symptoms are classic, as well  We discussed Botox in some detail however the patient would like to pursue pharmacologic therapy for now  Erectile dysfunction:  The patient has been taking sildenafil 40 mg as needed  He would like to do better  We discussed the 100 mg tablets and a prescription was sent for the patient  The patient is accompanied by his wife for the entire office visit    The following portions of the patient's history were reviewed and updated as appropriate: allergies, current medications, past family history, past medical history, past social history, past surgical history and problem list     Review of Systems   Constitutional: Negative for activity change and fatigue  Respiratory: Negative for shortness of breath and wheezing  Cardiovascular: Negative for chest pain  Gastrointestinal: Negative for abdominal pain  Genitourinary: Negative for difficulty urinating, dysuria, frequency, hematuria and urgency  Musculoskeletal: Negative for back pain and gait problem  Skin: Negative  Allergic/Immunologic: Negative  Neurological: Negative  Psychiatric/Behavioral: Negative  Objective:      /80 (BP Location: Left arm, Patient Position: Sitting, Cuff Size: Adult)   Pulse 76   Ht 5' 6" (1 676 m)   Wt 76 4 kg (168 lb 6 4 oz)   BMI 27 18 kg/m²           Physical Exam   Constitutional: He is oriented to person, place, and time  He appears well-developed and well-nourished  HENT:   Head: Normocephalic and atraumatic  Eyes: EOM are normal    Neck: Normal range of motion  Pulmonary/Chest: Effort normal    Genitourinary: Penis normal    Musculoskeletal: Normal range of motion  Neurological: He is alert and oriented to person, place, and time  Skin: Skin is warm and dry  Psychiatric: He has a normal mood and affect   His behavior is normal  Judgment and thought content normal

## 2019-09-24 NOTE — ASSESSMENT & PLAN NOTE
Continue Myrbetriq  The patient was given permission to increase the dose to 50 mg daily  We discussed Botox, but he is not interested now  Of note, his insurance is not that good and he is afraid of the out-of-pocket costs  His testosterone levels cost him over $400  Telephone Encounter by Bonita Abreu RN at 04/02/18 04:50 PM     Author:  Bonita Abreu RN Service:  (none) Author Type:  Registered Nurse     Filed:  04/02/18 04:50 PM Encounter Date:  3/30/2018 Status:  Signed     :  Bonita Abreu RN (Registered Nurse)       From: Gabby Zamudio  To: Shorty Cabrera MD  Sent: 3/30/2018  6:30 AM CDT  Subject: Medication Renewal Request    Original authorizing provider: MD Gabby Murphy would like a refill of the following medications:  hydrocortisone 2.5 % cream [Shorty Cabrrea MD]    Preferred pharmacy: [facility] [address]    Comment:  [facility]  Pharmacy in [facility]   All most out. Thank you!  [phone]    Medication renewals requested in this message routed to other providers:  atorvastatin (LIPITOR) 10 MG tablet [Heladio Cline MD]       Revision History        Date/Time User Provider Type Action    > 04/02/18 04:50 PM Bonita Abreu, RN Registered Nurse Sign    Attribution information within the note text is not available.

## 2019-12-11 DIAGNOSIS — R39.15 URGENCY OF URINATION: ICD-10-CM

## 2019-12-12 RX ORDER — MIRABEGRON 25 MG/1
TABLET, FILM COATED, EXTENDED RELEASE ORAL
Qty: 90 TABLET | Refills: 0 | Status: SHIPPED | OUTPATIENT
Start: 2019-12-12 | End: 2020-03-19

## 2020-03-19 DIAGNOSIS — R39.15 URGENCY OF URINATION: ICD-10-CM

## 2020-03-19 RX ORDER — MIRABEGRON 25 MG/1
TABLET, FILM COATED, EXTENDED RELEASE ORAL
Qty: 90 TABLET | Refills: 0 | Status: SHIPPED | OUTPATIENT
Start: 2020-03-19 | End: 2020-05-07

## 2020-05-07 ENCOUNTER — TELEPHONE (OUTPATIENT)
Dept: UROLOGY | Facility: MEDICAL CENTER | Age: 55
End: 2020-05-07

## 2020-05-07 DIAGNOSIS — R39.15 URGENCY OF URINATION: ICD-10-CM

## 2020-09-16 ENCOUNTER — TELEPHONE (OUTPATIENT)
Dept: UROLOGY | Facility: MEDICAL CENTER | Age: 55
End: 2020-09-16

## 2020-09-25 ENCOUNTER — OFFICE VISIT (OUTPATIENT)
Dept: UROLOGY | Facility: MEDICAL CENTER | Age: 55
End: 2020-09-25
Payer: COMMERCIAL

## 2020-09-25 ENCOUNTER — APPOINTMENT (OUTPATIENT)
Dept: LAB | Facility: MEDICAL CENTER | Age: 55
End: 2020-09-25
Payer: COMMERCIAL

## 2020-09-25 VITALS
HEART RATE: 78 BPM | WEIGHT: 167 LBS | DIASTOLIC BLOOD PRESSURE: 70 MMHG | BODY MASS INDEX: 26.95 KG/M2 | TEMPERATURE: 98.2 F | SYSTOLIC BLOOD PRESSURE: 140 MMHG

## 2020-09-25 DIAGNOSIS — N40.1 BPH WITH OBSTRUCTION/LOWER URINARY TRACT SYMPTOMS: ICD-10-CM

## 2020-09-25 DIAGNOSIS — Z71.89 OTHER SPECIFIED COUNSELING: ICD-10-CM

## 2020-09-25 DIAGNOSIS — N13.8 BPH WITH OBSTRUCTION/LOWER URINARY TRACT SYMPTOMS: ICD-10-CM

## 2020-09-25 DIAGNOSIS — N40.1 BPH WITH OBSTRUCTION/LOWER URINARY TRACT SYMPTOMS: Primary | ICD-10-CM

## 2020-09-25 DIAGNOSIS — N52.9 ERECTILE DYSFUNCTION, UNSPECIFIED ERECTILE DYSFUNCTION TYPE: ICD-10-CM

## 2020-09-25 DIAGNOSIS — N32.81 OAB (OVERACTIVE BLADDER): ICD-10-CM

## 2020-09-25 DIAGNOSIS — N13.8 BPH WITH OBSTRUCTION/LOWER URINARY TRACT SYMPTOMS: Primary | ICD-10-CM

## 2020-09-25 LAB
PSA SERPL-MCNC: 0.6 NG/ML (ref 0–4)
SL AMB  POCT GLUCOSE, UA: NORMAL
SL AMB LEUKOCYTE ESTERASE,UA: NORMAL
SL AMB POCT BILIRUBIN,UA: NORMAL
SL AMB POCT BLOOD,UA: NORMAL
SL AMB POCT CLARITY,UA: CLEAR
SL AMB POCT COLOR,UA: YELLOW
SL AMB POCT KETONES,UA: NORMAL
SL AMB POCT NITRITE,UA: NORMAL
SL AMB POCT PH,UA: 5.5
SL AMB POCT SPECIFIC GRAVITY,UA: >=1.03
SL AMB POCT URINE PROTEIN: NORMAL
SL AMB POCT UROBILINOGEN: NORMAL

## 2020-09-25 PROCEDURE — 81003 URINALYSIS AUTO W/O SCOPE: CPT | Performed by: UROLOGY

## 2020-09-25 PROCEDURE — 36415 COLL VENOUS BLD VENIPUNCTURE: CPT

## 2020-09-25 PROCEDURE — G0103 PSA SCREENING: HCPCS

## 2020-09-25 PROCEDURE — 99214 OFFICE O/P EST MOD 30 MIN: CPT | Performed by: UROLOGY

## 2020-09-25 RX ORDER — SILDENAFIL 100 MG/1
100 TABLET, FILM COATED ORAL AS NEEDED
Qty: 30 TABLET | Refills: 3 | Status: SHIPPED | OUTPATIENT
Start: 2020-09-25 | End: 2021-10-02

## 2020-09-25 NOTE — PROGRESS NOTES
Assessment/Plan:    OAB (overactive bladder)  The patient is taking Myrbetriq 100 mg without undue side effects and with substantial symptomatic benefit  Although his AUA score still fairly high, is urinary symptoms are much less intense and interfere much less in his activities of daily living than they did prior to treatment  Continue Myrbetriq 100 mg daily  Erectile dysfunction  Continue sildenafil  The patient gets a stuffy nose and I have recommended nasal spray or nose drops  BPH with obstruction/lower urinary tract symptoms  The patient's symptoms are due primarily to his overactive bladder  Digital rectal exam is negative  PSA pending  Diagnoses and all orders for this visit:    BPH with obstruction/lower urinary tract symptoms  -     PSA, Total Screen; Future  -     PSA, Total Screen; Future    Erectile dysfunction, unspecified erectile dysfunction type  -     POCT urine dip auto non-scope  -     sildenafil (VIAGRA) 100 mg tablet; Take 1 tablet (100 mg total) by mouth as needed for erectile dysfunction    OAB (overactive bladder)    Other specified counseling          Subjective:      Patient ID: Caleb Hickman is a 49 y o  male  HPI   Overactive bladder:  Doing much better on Myrbetriq  Taking 100 mg and doing much better  Fifty mg is insufficient  BPH:  He notes frequency, urgency and nocturia 4 times per night  He denies other significant urinary symptoms  He denies gross hematuria, urinary tract infections or incontinence  He is taking Myrbetriq for overactive bladder for his symptoms  PSA:  PSA for 2020 ordered  0   Lab Value Date/Time    PSA 0 7 09/11/2017 1545   ]  AUA score was 27 and quality of Life was "terrible" last year  AUA score this years 22 and quality of Life has improved to 1 level to unhappy  Pt notes he sleeps better and can hold urine much better  Now able to drive much longer and he has less stress from his bladder        Still carries a lemonade container in the car but no longer uses it  AUA SYMPTOM SCORE      Most Recent Value   AUA SYMPTOM SCORE   How often have you had a sensation of not emptying your bladder completely after you finished urinating? 3   How often have you had to urinate again less than two hours after you finished urinating? 3   How often have you found you stopped and started again several times when you urinate? 5   How often have you found it difficult to postpone urination? 3   How often have you had a weak urinary stream?  4   How often have you had to push or strain to begin urination? 2   How many times did you most typically get up to urinate from the time you went to bed at night until the time you got up in the morning? 2   Quality of Life: If you were to spend the rest of your life with your urinary condition just the way it is now, how would you feel about that?  5   AUA SYMPTOM SCORE  22        Erectile dysfunction:  Taking sildenafil successfully  The patient is accompanied by his wife  The following portions of the patient's history were reviewed and updated as appropriate: allergies, current medications, past family history, past medical history, past social history, past surgical history and problem list     Review of Systems        Objective:      /70 (BP Location: Left arm, Patient Position: Sitting, Cuff Size: Adult)   Pulse 78   Temp 98 2 °F (36 8 °C)   Wt 75 8 kg (167 lb)   BMI 26 95 kg/m²          Physical Exam  Constitutional:       Appearance: He is well-developed  HENT:      Head: Normocephalic and atraumatic  Neck:      Musculoskeletal: Normal range of motion and neck supple  Pulmonary:      Effort: Pulmonary effort is normal    Genitourinary:     Rectum: Normal       Comments: The prostate is 30 gm, firm, smooth, non-tender  Musculoskeletal: Normal range of motion  Skin:     General: Skin is warm and dry     Neurological:      Mental Status: He is alert and oriented to person, place, and time  Psychiatric:         Behavior: Behavior normal          Thought Content:  Thought content normal          Judgment: Judgment normal

## 2020-09-25 NOTE — ASSESSMENT & PLAN NOTE
Continue sildenafil  The patient gets a stuffy nose and I have recommended nasal spray or nose drops

## 2020-09-25 NOTE — ASSESSMENT & PLAN NOTE
The patient's symptoms are due primarily to his overactive bladder  Digital rectal exam is negative  PSA pending

## 2020-09-25 NOTE — ASSESSMENT & PLAN NOTE
The patient is taking Myrbetriq 100 mg without undue side effects and with substantial symptomatic benefit  Although his AUA score still fairly high, is urinary symptoms are much less intense and interfere much less in his activities of daily living than they did prior to treatment  Continue Myrbetriq 100 mg daily

## 2020-09-29 ENCOUNTER — TELEPHONE (OUTPATIENT)
Dept: UROLOGY | Facility: MEDICAL CENTER | Age: 55
End: 2020-09-29

## 2020-09-29 NOTE — TELEPHONE ENCOUNTER
Patient of Dr Annmarie Molina seen in the [facility] office  Patient called for PSA results  Patient advised that he is at work and if he does not answer a message can be left on his voicemail with the results  Please advise

## 2020-09-29 NOTE — TELEPHONE ENCOUNTER
PSA Results are in the chart and ready for review  Will route to the provider for further review and recommendations

## 2020-09-30 NOTE — TELEPHONE ENCOUNTER
Pt advised that PSA level is 0 6 and he is to return in one year for next PSA  Pt questioned why he had a level done and that he doesn't know what this level means  Advised that this level was normal and asked if he would like any further information about the test result  Pt declined and said he feels healthy and does not want to speak to anyone else about this matter  Pt is scheduled for F/U w/Dr Elean Shepard next October 2021 not 2020

## 2020-09-30 NOTE — TELEPHONE ENCOUNTER
LMOM (no comm consent on file) to return call to office  Pt to be advised of NEIL zelaya from 9/25 is  0 6  Pt has appt with Dr Taurus Tafoya tomorrow

## 2020-09-30 NOTE — TELEPHONE ENCOUNTER
Called wife, she is told that the PSA was normal   She is told that if there are any problems between now and next year, he should call the office  normal...

## 2020-10-31 ENCOUNTER — TELEPHONE (OUTPATIENT)
Dept: OTHER | Facility: OTHER | Age: 55
End: 2020-10-31

## 2021-01-04 NOTE — TELEPHONE ENCOUNTER
Patient seen on 6/18/19 by Dr Nadiya Platt in the [facility] office as consult for urgency  Per office note patient was prescribed oxybutynin for urgency and sildenafil for ED  No mention in the note of any labs being ordered  No recent lab results in patient's chart  Patient was instructed to follow up in 6 weeks to follow up on start of new medication and assess symptoms  Currently scheduled for follow up on 8/21/19 with Dr Nadiya Platt  Please advise on alternative for Oxybutynin  denies

## 2021-04-22 DIAGNOSIS — R39.15 URGENCY OF URINATION: ICD-10-CM

## 2021-04-22 RX ORDER — MIRABEGRON 50 MG/1
50 TABLET, FILM COATED, EXTENDED RELEASE ORAL DAILY
Qty: 90 TABLET | Refills: 3 | Status: SHIPPED | OUTPATIENT
Start: 2021-04-22

## 2021-09-30 DIAGNOSIS — N52.9 ERECTILE DYSFUNCTION, UNSPECIFIED ERECTILE DYSFUNCTION TYPE: ICD-10-CM

## 2021-10-01 ENCOUNTER — LAB (OUTPATIENT)
Dept: LAB | Facility: MEDICAL CENTER | Age: 56
End: 2021-10-01
Payer: COMMERCIAL

## 2021-10-01 ENCOUNTER — OFFICE VISIT (OUTPATIENT)
Dept: UROLOGY | Facility: MEDICAL CENTER | Age: 56
End: 2021-10-01
Payer: COMMERCIAL

## 2021-10-01 VITALS — BODY MASS INDEX: 27.16 KG/M2 | WEIGHT: 169 LBS | HEIGHT: 66 IN

## 2021-10-01 DIAGNOSIS — N32.81 OAB (OVERACTIVE BLADDER): ICD-10-CM

## 2021-10-01 DIAGNOSIS — Z12.11 ENCOUNTER FOR SCREENING COLONOSCOPY: ICD-10-CM

## 2021-10-01 DIAGNOSIS — N52.9 ERECTILE DYSFUNCTION, UNSPECIFIED ERECTILE DYSFUNCTION TYPE: ICD-10-CM

## 2021-10-01 DIAGNOSIS — N40.1 BPH WITH OBSTRUCTION/LOWER URINARY TRACT SYMPTOMS: ICD-10-CM

## 2021-10-01 DIAGNOSIS — Z12.5 SPECIAL SCREENING FOR MALIGNANT NEOPLASM OF PROSTATE: ICD-10-CM

## 2021-10-01 DIAGNOSIS — Z71.89 OTHER SPECIFIED COUNSELING: ICD-10-CM

## 2021-10-01 DIAGNOSIS — N13.8 BPH WITH OBSTRUCTION/LOWER URINARY TRACT SYMPTOMS: ICD-10-CM

## 2021-10-01 DIAGNOSIS — R39.15 URGENCY OF URINATION: Primary | ICD-10-CM

## 2021-10-01 LAB
POST-VOID RESIDUAL VOLUME, ML POC: 23 ML
PSA SERPL-MCNC: 0.8 NG/ML (ref 0–4)
PSA SERPL-MCNC: 0.8 NG/ML (ref 0–4)
SL AMB  POCT GLUCOSE, UA: NORMAL
SL AMB LEUKOCYTE ESTERASE,UA: NORMAL
SL AMB POCT BILIRUBIN,UA: NORMAL
SL AMB POCT BLOOD,UA: NORMAL
SL AMB POCT CLARITY,UA: CLEAR
SL AMB POCT COLOR,UA: YELLOW
SL AMB POCT KETONES,UA: NORMAL
SL AMB POCT NITRITE,UA: NORMAL
SL AMB POCT PH,UA: 7
SL AMB POCT SPECIFIC GRAVITY,UA: 1.02
SL AMB POCT URINE PROTEIN: NORMAL
SL AMB POCT UROBILINOGEN: 0.2

## 2021-10-01 PROCEDURE — 36415 COLL VENOUS BLD VENIPUNCTURE: CPT

## 2021-10-01 PROCEDURE — 81003 URINALYSIS AUTO W/O SCOPE: CPT | Performed by: UROLOGY

## 2021-10-01 PROCEDURE — 51798 US URINE CAPACITY MEASURE: CPT | Performed by: UROLOGY

## 2021-10-01 PROCEDURE — 99214 OFFICE O/P EST MOD 30 MIN: CPT | Performed by: UROLOGY

## 2021-10-01 PROCEDURE — 84153 ASSAY OF PSA TOTAL: CPT

## 2021-10-01 RX ORDER — TRIAMCINOLONE ACETONIDE 1 MG/G
CREAM TOPICAL AS NEEDED
COMMUNITY
Start: 2021-07-21

## 2021-10-02 RX ORDER — SILDENAFIL 100 MG/1
TABLET, FILM COATED ORAL
Qty: 6 TABLET | Refills: 19 | Status: SHIPPED | OUTPATIENT
Start: 2021-10-02

## 2021-10-25 ENCOUNTER — TELEPHONE (OUTPATIENT)
Dept: UROLOGY | Facility: MEDICAL CENTER | Age: 56
End: 2021-10-25

## 2022-10-29 ENCOUNTER — APPOINTMENT (OUTPATIENT)
Dept: LAB | Facility: MEDICAL CENTER | Age: 57
End: 2022-10-29

## 2022-10-29 DIAGNOSIS — N40.1 BPH WITH OBSTRUCTION/LOWER URINARY TRACT SYMPTOMS: ICD-10-CM

## 2022-10-29 DIAGNOSIS — N13.8 BPH WITH OBSTRUCTION/LOWER URINARY TRACT SYMPTOMS: ICD-10-CM

## 2022-10-29 LAB — PSA SERPL-MCNC: 0.8 NG/ML (ref 0–4)

## 2022-12-15 ENCOUNTER — OFFICE VISIT (OUTPATIENT)
Dept: UROLOGY | Facility: CLINIC | Age: 57
End: 2022-12-15
Payer: COMMERCIAL

## 2022-12-15 VITALS
DIASTOLIC BLOOD PRESSURE: 88 MMHG | BODY MASS INDEX: 28.61 KG/M2 | HEIGHT: 66 IN | WEIGHT: 178 LBS | SYSTOLIC BLOOD PRESSURE: 138 MMHG

## 2022-12-15 DIAGNOSIS — N40.1 BPH WITH OBSTRUCTION/LOWER URINARY TRACT SYMPTOMS: ICD-10-CM

## 2022-12-15 DIAGNOSIS — N52.9 ERECTILE DYSFUNCTION, UNSPECIFIED ERECTILE DYSFUNCTION TYPE: ICD-10-CM

## 2022-12-15 DIAGNOSIS — N13.8 BPH WITH OBSTRUCTION/LOWER URINARY TRACT SYMPTOMS: ICD-10-CM

## 2022-12-15 DIAGNOSIS — N13.8 BPH WITH OBSTRUCTION/LOWER URINARY TRACT SYMPTOMS: Primary | ICD-10-CM

## 2022-12-15 DIAGNOSIS — N40.1 BPH WITH OBSTRUCTION/LOWER URINARY TRACT SYMPTOMS: Primary | ICD-10-CM

## 2022-12-15 LAB
POST-VOID RESIDUAL VOLUME, ML POC: 0 ML
SL AMB  POCT GLUCOSE, UA: ABNORMAL
SL AMB LEUKOCYTE ESTERASE,UA: ABNORMAL
SL AMB POCT BILIRUBIN,UA: ABNORMAL
SL AMB POCT BLOOD,UA: ABNORMAL
SL AMB POCT CLARITY,UA: CLEAR
SL AMB POCT COLOR,UA: YELLOW
SL AMB POCT KETONES,UA: ABNORMAL
SL AMB POCT NITRITE,UA: ABNORMAL
SL AMB POCT PH,UA: 5
SL AMB POCT SPECIFIC GRAVITY,UA: 1.01
SL AMB POCT URINE PROTEIN: ABNORMAL
SL AMB POCT UROBILINOGEN: 0.2

## 2022-12-15 PROCEDURE — 81002 URINALYSIS NONAUTO W/O SCOPE: CPT | Performed by: PHYSICIAN ASSISTANT

## 2022-12-15 PROCEDURE — 51798 US URINE CAPACITY MEASURE: CPT | Performed by: PHYSICIAN ASSISTANT

## 2022-12-15 PROCEDURE — 99213 OFFICE O/P EST LOW 20 MIN: CPT | Performed by: PHYSICIAN ASSISTANT

## 2022-12-15 RX ORDER — TADALAFIL 5 MG/1
5 TABLET ORAL DAILY PRN
Qty: 30 TABLET | Refills: 10 | Status: SHIPPED | OUTPATIENT
Start: 2022-12-15 | End: 2022-12-15 | Stop reason: SDUPTHER

## 2022-12-15 RX ORDER — TADALAFIL 5 MG/1
5 TABLET ORAL DAILY PRN
Qty: 30 TABLET | Refills: 0 | Status: SHIPPED | OUTPATIENT
Start: 2022-12-15

## 2022-12-15 NOTE — TELEPHONE ENCOUNTER
Medication Refill Request     Name tadalafil (CIALIS) 5 MG tablet  Dose/Frequency daily  Quantity 30  Verified pharmacy   [x]  Verified ordering Provider   [x]  Does patient have enough for the next 3 days? Yes [] No [x]      Patient stated that the medication was not at his pharmacy and is requesting it be resent

## 2023-01-09 DIAGNOSIS — N13.8 BPH WITH OBSTRUCTION/LOWER URINARY TRACT SYMPTOMS: ICD-10-CM

## 2023-01-09 DIAGNOSIS — N40.1 BPH WITH OBSTRUCTION/LOWER URINARY TRACT SYMPTOMS: ICD-10-CM

## 2023-01-09 RX ORDER — TADALAFIL 5 MG/1
5 TABLET ORAL DAILY PRN
Qty: 90 TABLET | Refills: 0 | Status: SHIPPED | OUTPATIENT
Start: 2023-01-09 | End: 2023-01-11 | Stop reason: SDUPTHER

## 2023-01-09 RX ORDER — TADALAFIL 5 MG/1
5 TABLET ORAL DAILY PRN
Qty: 30 TABLET | Refills: 0 | Status: CANCELLED | OUTPATIENT
Start: 2023-01-09

## 2023-01-09 NOTE — TELEPHONE ENCOUNTER
Pt wife calling and asking for a year refill of tadalafil (CIALIS) 5 MG tablet   Please call back to discuss and please call back after 3 pm to discuss

## 2023-01-10 ENCOUNTER — TELEPHONE (OUTPATIENT)
Dept: OTHER | Facility: OTHER | Age: 58
End: 2023-01-10

## 2023-01-10 NOTE — TELEPHONE ENCOUNTER
Pt wife called back as she has not received a call from the office yet regarding patient's medication   Please call Hanna after 3pm

## 2023-01-10 NOTE — TELEPHONE ENCOUNTER
Pt wife calling and there was a refill for 90 days only and has no refills for his Cialis  Patient requesting a call back to discuss

## 2023-01-11 DIAGNOSIS — N40.1 BPH WITH OBSTRUCTION/LOWER URINARY TRACT SYMPTOMS: ICD-10-CM

## 2023-01-11 DIAGNOSIS — N13.8 BPH WITH OBSTRUCTION/LOWER URINARY TRACT SYMPTOMS: ICD-10-CM

## 2023-01-11 RX ORDER — TADALAFIL 5 MG/1
5 TABLET ORAL DAILY PRN
Qty: 90 TABLET | Refills: 3 | Status: SHIPPED | OUTPATIENT
Start: 2023-01-11

## 2023-08-21 NOTE — PROGRESS NOTES
UROLOGY PROGRESS NOTE   Patient Identifiers: Bonnie Moreira (MRN [id number])  Date of Service: 12/15/2022    Subjective:   32-year-old man previously seen by Dr. Danna Rojas history of BPH, erectile dysfunction and overactive bladder. PSA 0.8. He is fairly stable on 5 mg of tadalafil daily. His bladder empties well. PVR 0. Urine shows 2+ microscopic blood. Reason for visit: BPH/ prostate cancer screening    Objective:     VITALS:    Vitals:    12/15/22 0802   BP: 138/88           LABS:  Lab Results   Component Value Date    HGB 15.0 09/11/2017    HCT 43.9 09/11/2017    WBC 9.13 09/11/2017     09/11/2017   ]    Lab Results   Component Value Date    K 4.4 09/11/2017     09/11/2017    CO2 29 09/11/2017    BUN 15 09/11/2017    CREATININE 1.12 09/11/2017    CALCIUM 9.5 09/11/2017   ]        INPATIENT MEDS:    Current Outpatient Medications:   •  triamcinolone (KENALOG) 0.1 % cream, as needed , Disp: , Rfl:   •  tadalafil (CIALIS) 5 MG tablet, Take 1 tablet (5 mg total) by mouth daily as needed for erectile dysfunction, Disp: 90 tablet, Rfl: 3      Physical Exam:   /88 (BP Location: Left arm, Patient Position: Sitting, Cuff Size: Adult)   Ht 5' 6" (1.676 m)   Wt 80.7 kg (178 lb)   BMI 28.73 kg/m²   GEN: no acute distress    RESP: breathing comfortably with no accessory muscle use    ABD: soft, non-tender, non-distended   INCISION:    EXT: no significant peripheral edema   (Male): Penis circumcised, phallus normal, meatus patent. Testicles descended into scrotum bilaterally without masses nor tenderness. No inguinal hernias bilaterally. TORRI: Prostate is enlarged at 35 grams. The prostate is not boggy. The prostate is not tender. No nodules noted      RADIOLOGY:   none     Assessment:   #1.   BPH    Plan:   -Follow-up in 1 year with PSA prior to visit  -  -  -

## 2023-10-10 ENCOUNTER — TELEPHONE (OUTPATIENT)
Dept: UROLOGY | Facility: AMBULATORY SURGERY CENTER | Age: 58
End: 2023-10-10

## 2023-10-10 NOTE — TELEPHONE ENCOUNTER
Pt's spouse called to schedule appointment with Kenn Byrnes PA-C I verified demographics she states he has new insurance [employer] but she not have time of call,she will call back with Insurance information. 0 = understands/communicates without difficulty N/A

## 2023-10-13 ENCOUNTER — APPOINTMENT (OUTPATIENT)
Dept: LAB | Facility: MEDICAL CENTER | Age: 58
End: 2023-10-13
Payer: COMMERCIAL

## 2023-10-13 DIAGNOSIS — N13.8 BPH WITH OBSTRUCTION/LOWER URINARY TRACT SYMPTOMS: ICD-10-CM

## 2023-10-13 DIAGNOSIS — N40.1 BPH WITH OBSTRUCTION/LOWER URINARY TRACT SYMPTOMS: ICD-10-CM

## 2023-10-13 LAB — PSA SERPL-MCNC: 0.79 NG/ML (ref 0–4)

## 2023-10-13 PROCEDURE — 36415 COLL VENOUS BLD VENIPUNCTURE: CPT

## 2023-10-13 PROCEDURE — 84153 ASSAY OF PSA TOTAL: CPT

## 2023-12-20 ENCOUNTER — OFFICE VISIT (OUTPATIENT)
Dept: UROLOGY | Facility: CLINIC | Age: 58
End: 2023-12-20
Payer: COMMERCIAL

## 2023-12-20 VITALS
HEART RATE: 80 BPM | DIASTOLIC BLOOD PRESSURE: 78 MMHG | SYSTOLIC BLOOD PRESSURE: 122 MMHG | WEIGHT: 182.2 LBS | OXYGEN SATURATION: 100 % | HEIGHT: 66 IN | BODY MASS INDEX: 29.28 KG/M2

## 2023-12-20 DIAGNOSIS — N40.1 BPH WITH OBSTRUCTION/LOWER URINARY TRACT SYMPTOMS: Primary | ICD-10-CM

## 2023-12-20 DIAGNOSIS — N13.8 BPH WITH OBSTRUCTION/LOWER URINARY TRACT SYMPTOMS: Primary | ICD-10-CM

## 2023-12-20 PROCEDURE — 99203 OFFICE O/P NEW LOW 30 MIN: CPT | Performed by: UROLOGY

## 2023-12-20 RX ORDER — TADALAFIL 5 MG/1
5 TABLET ORAL DAILY PRN
Qty: 90 TABLET | Refills: 3 | Status: SHIPPED | OUTPATIENT
Start: 2023-12-20

## 2023-12-20 NOTE — PROGRESS NOTES
"Referring Physician: No primary care provider on file.  A copy of this note was sent to the referring physician.       Diagnoses and all orders for this visit:    BPH with obstruction/lower urinary tract symptoms  -     tadalafil (CIALIS) 5 MG tablet; Take 1 tablet (5 mg total) by mouth daily as needed for erectile dysfunction  -     PSA Total, Diagnostic; Future            Assessment and plan:       1.  BPH  -Doing well with Cialis daily    2.  Prostate cancer screening  -Negative digital rectal examination  -PSA is less than 1    3. ED  -Also doing well with Cialis daily      Today, we discussed a stepwise approach in treating lower urinary tract symptoms associated with BPH.    Lower urinary tract symptoms (LUTS) can be sub-divided into those that result from failure of the bladder to store urine normally (\"storage symptoms\"), those that result from difficulties in emptying (\"voiding symptoms\"), or those that follow micturition (\"post-micturition symptoms\").    Obstructive symptoms such as hesitancy, weak stream, and pushing to urinate are classified as voiding symptoms. OAB is due to the inability of the bladder to store urine normally. The symptoms of frequency, urgency, nocturia, and urge incontinence are classified as storage symptoms. I discussed the mainstays of therapy for voiding symptoms including watchful waiting, conservative interventions, pharmacotherapy with alpha blockers, 5-alpha reductase inhibitors, and PDE5 inhibitors.  We also discussed surgical management including UroLift, TURP, laser/ablative procedures, and prostatectomy.  Pros and cons, expectations, lifestyle and sexual adverse effects of all modalities were reviewed.    Daily Cialis is working very well for Balaji.  I provided refills for the next year    He will return to see our advanced practitioner team annually with a PSA prior to the visit and a rectal exam.  He knows to Call if he needs anything sooner.    Freeman Saunders, " MD      Chief Complaint     BPH, ED consult      History of Present Illness     Balaji Duran is a 58 y.o. male referred for consultation of BPH and ED    Detailed Urologic History     - please refer to HPI    Review of Systems     Review of Systems   Constitutional: Negative for activity change and fatigue.   HENT: Negative for congestion.    Eyes: Negative for visual disturbance.   Respiratory: Negative for shortness of breath and wheezing.    Cardiovascular: Negative for chest pain and leg swelling.   Gastrointestinal: Negative for abdominal pain.   Endocrine: Negative for polyuria.   Genitourinary: Negative for dysuria, flank pain, hematuria and urgency.   Musculoskeletal: Negative for back pain.   Allergic/Immunologic: Negative for immunocompromised state.   Neurological: Negative for dizziness and numbness.   Psychiatric/Behavioral: Negative for dysphoric mood.   All other systems reviewed and are negative.    AUA SYMPTOM SCORE      Flowsheet Row Most Recent Value   AUA SYMPTOM SCORE    How often have you had a sensation of not emptying your bladder completely after you finished urinating? 3 (P)    How often have you had to urinate again less than two hours after you finished urinating? 5 (P)    How often have you found you stopped and started again several times when you urinate? 3 (P)    How often have you found it difficult to postpone urination? 3 (P)    How often have you had a weak urinary stream? 3 (P)    How often have you had to push or strain to begin urination? 0 (P)    How many times did you most typically get up to urinate from the time you went to bed at night until the time you got up in the morning? 4 (P)    Quality of Life: If you were to spend the rest of your life with your urinary condition just the way it is now, how would you feel about that? 4 (P)    AUA SYMPTOM SCORE 21 (P)               Allergies     No Known Allergies    Physical Exam       Physical Exam  Constitutional:       General:  "He is not in acute distress.     Appearance: He is well-developed.   HENT:      Head: Normocephalic and atraumatic.   Cardiovascular:      Comments: Negative lower extremity edema  Pulmonary:      Effort: Pulmonary effort is normal.      Breath sounds: Normal breath sounds.   Abdominal:      Palpations: Abdomen is soft.   Musculoskeletal:         General: Normal range of motion.      Cervical back: Normal range of motion.   Skin:     General: Skin is warm.   Neurological:      Mental Status: He is alert and oriented to person, place, and time.   Psychiatric:         Behavior: Behavior normal.     30 to 40 g prostate, no nodules or induration      Vital Signs  Vitals:    12/20/23 1520   BP: 122/78   BP Location: Right arm   Patient Position: Sitting   Cuff Size: Adult   Pulse: 80   SpO2: 100%   Weight: 82.6 kg (182 lb 3.2 oz)   Height: 5' 6\" (1.676 m)         Current Medications       Current Outpatient Medications:     tadalafil (CIALIS) 5 MG tablet, Take 1 tablet (5 mg total) by mouth daily as needed for erectile dysfunction, Disp: 90 tablet, Rfl: 3      Active Problems     Patient Active Problem List   Diagnosis    Erectile dysfunction    Urgency of urination    OAB (overactive bladder)    BPH with obstruction/lower urinary tract symptoms         Past Medical History     Past Medical History:   Diagnosis Date    Frequent urination          Surgical History     Past Surgical History:   Procedure Laterality Date    CYSTOSCOPY  09/24/2019    DC RPR 1ST INGUN HRNA AGE 6 YRS/> REDUCIBLE Right 2/9/2018    Procedure: INGUINAL HERNIA REPAIR;  Surgeon: Alvaro Arrington DO;  Location: [facility];  Service: General    VASECTOMY      WISDOM TOOTH EXTRACTION           Family History     History reviewed. No pertinent family history.      Social History     Social History     Social History     Tobacco Use   Smoking Status Never   Smokeless Tobacco Never         Pertinent Lab Values     Lab Results   Component Value Date    " "CREATININE 1.12 09/11/2017       Lab Results   Component Value Date    PSA 0.79 10/13/2023    PSA 0.8 10/29/2022    PSA 0.8 10/01/2021    PSA 0.8 10/01/2021       @RESULTRCNT(1H])@      Pertinent Imaging       No results found.      Portions of the record may have been created with voice recognition software.  Occasional wrong word or \"sound a like\" substitutions may have occurred due to the inherent limitations of voice recognition software.  In addition some of the content generated from this outpatient encounter includes information designed for patient education and/or communication back to the referring provider.  Read the chart carefully and recognize, using context, where substitutions have occurred.    "

## 2024-11-16 ENCOUNTER — APPOINTMENT (OUTPATIENT)
Dept: LAB | Facility: MEDICAL CENTER | Age: 59
End: 2024-11-16
Payer: COMMERCIAL

## 2024-11-16 DIAGNOSIS — N13.8 BPH WITH OBSTRUCTION/LOWER URINARY TRACT SYMPTOMS: ICD-10-CM

## 2024-11-16 DIAGNOSIS — N40.1 BPH WITH OBSTRUCTION/LOWER URINARY TRACT SYMPTOMS: ICD-10-CM

## 2024-11-16 LAB — PSA SERPL-MCNC: 0.91 NG/ML (ref 0–4)

## 2024-11-16 PROCEDURE — 36415 COLL VENOUS BLD VENIPUNCTURE: CPT

## 2024-11-16 PROCEDURE — 84153 ASSAY OF PSA TOTAL: CPT

## 2024-12-23 ENCOUNTER — OFFICE VISIT (OUTPATIENT)
Dept: UROLOGY | Facility: CLINIC | Age: 59
End: 2024-12-23
Payer: COMMERCIAL

## 2024-12-23 VITALS
BODY MASS INDEX: 28.72 KG/M2 | WEIGHT: 183 LBS | HEART RATE: 87 BPM | SYSTOLIC BLOOD PRESSURE: 130 MMHG | HEIGHT: 67 IN | OXYGEN SATURATION: 98 % | DIASTOLIC BLOOD PRESSURE: 72 MMHG

## 2024-12-23 DIAGNOSIS — N40.1 BPH WITH OBSTRUCTION/LOWER URINARY TRACT SYMPTOMS: Primary | ICD-10-CM

## 2024-12-23 DIAGNOSIS — N13.8 BPH WITH OBSTRUCTION/LOWER URINARY TRACT SYMPTOMS: Primary | ICD-10-CM

## 2024-12-23 PROCEDURE — 99213 OFFICE O/P EST LOW 20 MIN: CPT | Performed by: PHYSICIAN ASSISTANT

## 2024-12-23 NOTE — PROGRESS NOTES
UROLOGY PROGRESS NOTE   Patient Identifiers: Balaji Duran (MRN [id number])  Date of Service: 12/23/2024    Subjective:   59-year-old man history of BPH and ED.  PSA 0.9.  Stable on 5 mg of tadalafil.  I encouraged him to get a family doctor.  He has episodes of urinary frequency where he voids every hour to hour and a half.  These happen every few weeks.    Reason for visit: BPH follow-up    Objective:     VITALS:    There were no vitals filed for this visit.  AUA SYMPTOM SCORE      Flowsheet Row Most Recent Value   AUA SYMPTOM SCORE    How often have you had a sensation of not emptying your bladder completely after you finished urinating? 3 (P)     How often have you had to urinate again less than two hours after you finished urinating? 5 (P)     How often have you found you stopped and started again several times when you urinate? 4 (P)     How often have you found it difficult to postpone urination? 4 (P)     How often have you had a weak urinary stream? 4 (P)     How often have you had to push or strain to begin urination? 2 (P)     How many times did you most typically get up to urinate from the time you went to bed at night until the time you got up in the morning? 4 (P)     Quality of Life: If you were to spend the rest of your life with your urinary condition just the way it is now, how would you feel about that? 4 (P)     AUA SYMPTOM SCORE 26 (P)                LABS:  Lab Results   Component Value Date    HGB 15.0 09/11/2017    HCT 43.9 09/11/2017    WBC 9.13 09/11/2017     09/11/2017   ]    Lab Results   Component Value Date    K 4.4 09/11/2017     09/11/2017    CO2 29 09/11/2017    BUN 15 09/11/2017    CREATININE 1.12 09/11/2017    CALCIUM 9.5 09/11/2017   ]        INPATIENT MEDS:    Current Outpatient Medications:     tadalafil (CIALIS) 5 MG tablet, Take 1 tablet (5 mg total) by mouth daily as needed for erectile dysfunction, Disp: 90 tablet, Rfl: 3      Physical Exam:   There were no vitals  taken for this visit.  GEN: no acute distress    RESP: breathing comfortably with no accessory muscle use    ABD: soft, non-tender, non-distended   INCISION:    EXT: no significant peripheral edema   (Male): Penis circumcised, phallus normal, meatus patent.  Testicles descended into scrotum bilaterally without masses nor tenderness.  No inguinal hernias bilaterally.  TORRI: Prostate is enlarged at 35 grams. The prostate is not boggy. The prostate is not tender.  No nodules noted      RADIOLOGY:   none     Assessment:   #1.  BPH    Plan:   -Follow-up 1 year with PSA prior to visit  -  -  -

## 2025-02-15 DIAGNOSIS — N40.1 BPH WITH OBSTRUCTION/LOWER URINARY TRACT SYMPTOMS: ICD-10-CM

## 2025-02-15 DIAGNOSIS — N13.8 BPH WITH OBSTRUCTION/LOWER URINARY TRACT SYMPTOMS: ICD-10-CM

## 2025-02-17 RX ORDER — TADALAFIL 5 MG/1
TABLET ORAL
Qty: 90 TABLET | Refills: 1 | Status: SHIPPED | OUTPATIENT
Start: 2025-02-17

## (undated) DEVICE — CHLORAPREP HI-LITE 26ML ORANGE

## (undated) DEVICE — REM POLYHESIVE ADULT PATIENT RETURN ELECTRODE: Brand: VALLEYLAB

## (undated) DEVICE — STRL UNIVERSAL MINOR GENERAL: Brand: CARDINAL HEALTH

## (undated) DEVICE — SUT MONOCRYL 4-0 PS-2 27 IN Y426H

## (undated) DEVICE — SUT VICRYL 2-0 SH 27 IN UNDYED J417H

## (undated) DEVICE — VIAL DECANTER

## (undated) DEVICE — NEEDLE 22 G X 1 1/2 SAFETY

## (undated) DEVICE — ADHESIVE SKN CLSR HISTOACRYL FLEX 0.5ML LF

## (undated) DEVICE — GLOVE SRG BIOGEL ORTHOPEDIC 8

## (undated) DEVICE — LIGHT HANDLE COVER SLEEVE DISP BLUE STELLAR

## (undated) DEVICE — TOWEL SET X-RAY

## (undated) DEVICE — SCD SEQUENTIAL COMPRESSION COMFORT SLEEVE MEDIUM KNEE LENGTH: Brand: KENDALL SCD

## (undated) DEVICE — PLUMEPEN PRO 10FT

## (undated) DEVICE — INTENDED FOR TISSUE SEPARATION, AND OTHER PROCEDURES THAT REQUIRE A SHARP SURGICAL BLADE TO PUNCTURE OR CUT.: Brand: BARD-PARKER SAFETY BLADES SIZE 15, STERILE